# Patient Record
Sex: FEMALE | Race: WHITE | NOT HISPANIC OR LATINO | Employment: FULL TIME | URBAN - METROPOLITAN AREA
[De-identification: names, ages, dates, MRNs, and addresses within clinical notes are randomized per-mention and may not be internally consistent; named-entity substitution may affect disease eponyms.]

---

## 2017-01-06 ENCOUNTER — COMMUNICATION - HEALTHEAST (OUTPATIENT)
Dept: INTERNAL MEDICINE | Facility: CLINIC | Age: 47
End: 2017-01-06

## 2017-06-29 ENCOUNTER — COMMUNICATION - HEALTHEAST (OUTPATIENT)
Dept: INTERNAL MEDICINE | Facility: CLINIC | Age: 47
End: 2017-06-29

## 2017-06-29 DIAGNOSIS — N63.10 MASS OF BREAST, RIGHT: ICD-10-CM

## 2017-07-03 ENCOUNTER — HOSPITAL ENCOUNTER (OUTPATIENT)
Dept: MAMMOGRAPHY | Facility: CLINIC | Age: 47
Discharge: HOME | End: 2017-07-03
Attending: INTERNAL MEDICINE

## 2017-07-03 ENCOUNTER — COMMUNICATION - HEALTHEAST (OUTPATIENT)
Dept: TELEHEALTH | Facility: CLINIC | Age: 47
End: 2017-07-03

## 2017-07-03 ENCOUNTER — HOSPITAL ENCOUNTER (OUTPATIENT)
Dept: ULTRASOUND IMAGING | Facility: CLINIC | Age: 47
Discharge: HOME | End: 2017-07-03
Attending: INTERNAL MEDICINE

## 2017-07-03 DIAGNOSIS — N63.10 MASS OF BREAST, RIGHT: ICD-10-CM

## 2017-07-05 ENCOUNTER — OFFICE VISIT - HEALTHEAST (OUTPATIENT)
Dept: INTERNAL MEDICINE | Facility: CLINIC | Age: 47
End: 2017-07-05

## 2017-07-05 DIAGNOSIS — R10.13 DYSPEPSIA: ICD-10-CM

## 2017-07-05 DIAGNOSIS — Z00.00 ROUTINE GENERAL MEDICAL EXAMINATION AT A HEALTH CARE FACILITY: ICD-10-CM

## 2017-07-05 DIAGNOSIS — Z12.4 SCREENING FOR CERVICAL CANCER: ICD-10-CM

## 2017-07-05 DIAGNOSIS — Z13.1 SCREENING FOR DIABETES MELLITUS: ICD-10-CM

## 2017-07-05 DIAGNOSIS — Z13.220 SCREENING FOR HYPERLIPIDEMIA: ICD-10-CM

## 2017-07-05 LAB
CHOLEST SERPL-MCNC: 154 MG/DL
FASTING STATUS PATIENT QL REPORTED: YES
HDLC SERPL-MCNC: 61 MG/DL
LDLC SERPL CALC-MCNC: 82 MG/DL
TRIGL SERPL-MCNC: 54 MG/DL

## 2017-07-05 ASSESSMENT — MIFFLIN-ST. JEOR: SCORE: 1523.07

## 2017-07-10 LAB
HPV INTERPRETATION - HISTORICAL: NORMAL
HPV INTERPRETER - HISTORICAL: NORMAL

## 2017-07-17 LAB
BKR LAB AP ABNORMAL BLEEDING: NO
BKR LAB AP BIRTH CONTROL/HORMONES: NORMAL
BKR LAB AP CERVICAL APPEARANCE: NORMAL
BKR LAB AP GYN ADEQUACY: NORMAL
BKR LAB AP GYN INTERPRETATION: NORMAL
BKR LAB AP HPV REFLEX: NORMAL
BKR LAB AP LMP: NORMAL
BKR LAB AP PATIENT STATUS: NO
BKR LAB AP PREVIOUS ABNORMAL: NO
BKR LAB AP PREVIOUS NORMAL: NORMAL
HIGH RISK?: NO
PATH REPORT.COMMENTS IMP SPEC: NORMAL
RESULT FLAG (HE HISTORICAL CONVERSION): NORMAL

## 2017-07-18 ENCOUNTER — COMMUNICATION - HEALTHEAST (OUTPATIENT)
Dept: INTERNAL MEDICINE | Facility: CLINIC | Age: 47
End: 2017-07-18

## 2017-08-03 ENCOUNTER — COMMUNICATION - HEALTHEAST (OUTPATIENT)
Dept: INTERNAL MEDICINE | Facility: CLINIC | Age: 47
End: 2017-08-03

## 2017-10-12 ENCOUNTER — RECORDS - HEALTHEAST (OUTPATIENT)
Dept: ADMINISTRATIVE | Facility: OTHER | Age: 47
End: 2017-10-12

## 2017-10-20 ENCOUNTER — ALLIED HEALTH/NURSE VISIT (OUTPATIENT)
Dept: NURSING | Facility: CLINIC | Age: 47
End: 2017-10-20
Payer: COMMERCIAL

## 2017-10-20 DIAGNOSIS — Z23 NEED FOR PROPHYLACTIC VACCINATION AND INOCULATION AGAINST INFLUENZA: Primary | ICD-10-CM

## 2017-10-20 PROCEDURE — 90686 IIV4 VACC NO PRSV 0.5 ML IM: CPT

## 2017-10-20 PROCEDURE — 99207 ZZC NO CHARGE NURSE ONLY: CPT

## 2017-10-20 PROCEDURE — 90471 IMMUNIZATION ADMIN: CPT

## 2017-10-20 NOTE — MR AVS SNAPSHOT
"              After Visit Summary   10/20/2017    Tamera Spence    MRN: [id number]           Patient Information     Date Of Birth          1970        Visit Information        Provider Department      10/20/2017 10:15 AM [facility] IMMUNIZATION NURSE [facility]        Today's Diagnoses     Need for prophylactic vaccination and inoculation against influenza    -  1       Follow-ups after your visit        Who to contact     If you have questions or need follow up information about today's clinic visit or your schedule please contact [facility] directly at [phone].  Normal or non-critical lab and imaging results will be communicated to you by [employer], letter or phone within 4 business days after the clinic has received the results. If you do not hear from us within 7 days, please contact the clinic through [employer] or phone. If you have a critical or abnormal lab result, we will notify you by phone as soon as possible.  Submit refill requests through [employer] or call your pharmacy and they will forward the refill request to us. Please allow 3 business days for your refill to be completed.          Additional Information About Your Visit        MyChart Information     [employer] lets you send messages to your doctor, view your test results, renew your prescriptions, schedule appointments and more. To sign up, go to [email or web address] . Click on \"Log in\" on the left side of the screen, which will take you to the Welcome page. Then click on \"Sign up Now\" on the right side of the page.     You will be asked to enter the access code listed below, as well as some personal information. Please follow the directions to create your username and password.     Your access code is: [email or web address]  Expires: 2018 10:24 AM     Your access code will  in 90 days. If you need help or a new code, please call your [facility] or [phone].        Care " EveryWhere ID     This is your Care EveryWhere ID. This could be used by other organizations to access your [facility] medical records  [id number]         Blood Pressure from Last 3 Encounters:   06/18/15 104/60   12/30/10 104/70   02/08/10 132/68    Weight from Last 3 Encounters:   06/18/15 180 lb (81.6 kg)   02/08/10 170 lb (77.1 kg)   11/18/09 189 lb 9.6 oz (86 kg)              We Performed the Following     ADMIN 1st VACCINE     HC FLU VAC PRESRV FREE QUAD SPLIT VIR 3+YRS IM        Primary Care Provider    None Specified       No primary provider on file.        Equal Access to Services     [facility] : Hadii david Case, nitesh del rio, brianne vergaraalmada riaz, raysa avery . So [facility] [phone].    ATENCIÓN: Si habla español, tiene a recinos disposición servicios gratuitos de asistencia lingüística. Llame al [phone].    We comply with applicable federal civil rights laws and [place] laws. We do not discriminate on the basis of race, color, national origin, age, disability, sex, sexual orientation, or gender identity.            Thank you!     Thank you for choosing [facility]  for your care. Our goal is always to provide you with excellent care. Hearing back from our patients is one way we can continue to improve our services. Please take a few minutes to complete the written survey that you may receive in the mail after your visit with us. Thank you!             Your Updated Medication List - Protect others around you: Learn how to safely use, store and throw away your medicines at www.disposemymeds.org.          This list is accurate as of: 10/20/17 10:24 AM.  Always use your most recent med list.                   Brand Name Dispense Instructions for use Diagnosis    PRENATAL PLUS Tabs     90    1 TABLET DAILY    Supervision of normal first pregnancy       PROTONIX PO

## 2017-10-20 NOTE — PROGRESS NOTES

## 2017-10-23 ENCOUNTER — RECORDS - HEALTHEAST (OUTPATIENT)
Dept: ADMINISTRATIVE | Facility: OTHER | Age: 47
End: 2017-10-23

## 2017-10-24 ENCOUNTER — RECORDS - HEALTHEAST (OUTPATIENT)
Dept: ADMINISTRATIVE | Facility: OTHER | Age: 47
End: 2017-10-24

## 2017-10-25 ENCOUNTER — RECORDS - HEALTHEAST (OUTPATIENT)
Dept: ADMINISTRATIVE | Facility: OTHER | Age: 47
End: 2017-10-25

## 2017-12-01 ENCOUNTER — OFFICE VISIT - HEALTHEAST (OUTPATIENT)
Dept: INTERNAL MEDICINE | Facility: CLINIC | Age: 47
End: 2017-12-01

## 2017-12-01 DIAGNOSIS — R10.13 DYSPEPSIA: ICD-10-CM

## 2017-12-01 DIAGNOSIS — F51.01 PRIMARY INSOMNIA: ICD-10-CM

## 2017-12-01 ASSESSMENT — MIFFLIN-ST. JEOR: SCORE: 1523.07

## 2018-01-26 ENCOUNTER — COMMUNICATION - HEALTHEAST (OUTPATIENT)
Dept: INTERNAL MEDICINE | Facility: CLINIC | Age: 48
End: 2018-01-26

## 2018-01-26 DIAGNOSIS — H26.9 CATARACTS, BILATERAL: ICD-10-CM

## 2018-02-01 ENCOUNTER — RECORDS - HEALTHEAST (OUTPATIENT)
Dept: ADMINISTRATIVE | Facility: OTHER | Age: 48
End: 2018-02-01

## 2018-02-02 ENCOUNTER — COMMUNICATION - HEALTHEAST (OUTPATIENT)
Dept: INTERNAL MEDICINE | Facility: CLINIC | Age: 48
End: 2018-02-02

## 2018-07-02 ENCOUNTER — OFFICE VISIT - HEALTHEAST (OUTPATIENT)
Dept: INTERNAL MEDICINE | Facility: CLINIC | Age: 48
End: 2018-07-02

## 2018-07-02 DIAGNOSIS — H00.019 STYE: ICD-10-CM

## 2018-07-09 ENCOUNTER — COMMUNICATION - HEALTHEAST (OUTPATIENT)
Dept: INTERNAL MEDICINE | Facility: CLINIC | Age: 48
End: 2018-07-09

## 2018-07-10 ENCOUNTER — RECORDS - HEALTHEAST (OUTPATIENT)
Dept: ADMINISTRATIVE | Facility: OTHER | Age: 48
End: 2018-07-10

## 2018-08-02 ENCOUNTER — COMMUNICATION - HEALTHEAST (OUTPATIENT)
Dept: INTERNAL MEDICINE | Facility: CLINIC | Age: 48
End: 2018-08-02

## 2018-08-15 ENCOUNTER — COMMUNICATION - HEALTHEAST (OUTPATIENT)
Dept: INTERNAL MEDICINE | Facility: CLINIC | Age: 48
End: 2018-08-15

## 2018-09-07 ENCOUNTER — OFFICE VISIT - HEALTHEAST (OUTPATIENT)
Dept: INTERNAL MEDICINE | Facility: CLINIC | Age: 48
End: 2018-09-07

## 2018-09-07 DIAGNOSIS — Z12.31 VISIT FOR SCREENING MAMMOGRAM: ICD-10-CM

## 2018-09-07 DIAGNOSIS — G47.00 INSOMNIA: ICD-10-CM

## 2018-09-07 DIAGNOSIS — F32.0 MILD MAJOR DEPRESSION (H): ICD-10-CM

## 2018-09-07 ASSESSMENT — MIFFLIN-ST. JEOR: SCORE: 1477.71

## 2018-09-11 ENCOUNTER — RECORDS - HEALTHEAST (OUTPATIENT)
Dept: ADMINISTRATIVE | Facility: OTHER | Age: 48
End: 2018-09-11

## 2018-09-14 ENCOUNTER — COMMUNICATION - HEALTHEAST (OUTPATIENT)
Dept: INTERNAL MEDICINE | Facility: CLINIC | Age: 48
End: 2018-09-14

## 2018-09-19 ENCOUNTER — HOSPITAL ENCOUNTER (OUTPATIENT)
Dept: MAMMOGRAPHY | Facility: CLINIC | Age: 48
Discharge: HOME | End: 2018-09-19
Attending: INTERNAL MEDICINE

## 2018-09-19 DIAGNOSIS — Z12.31 VISIT FOR SCREENING MAMMOGRAM: ICD-10-CM

## 2018-12-14 ENCOUNTER — COMMUNICATION - HEALTHEAST (OUTPATIENT)
Dept: INTERNAL MEDICINE | Facility: CLINIC | Age: 48
End: 2018-12-14

## 2019-01-31 ENCOUNTER — COMMUNICATION - HEALTHEAST (OUTPATIENT)
Dept: INTERNAL MEDICINE | Facility: CLINIC | Age: 49
End: 2019-01-31

## 2019-02-07 ENCOUNTER — OFFICE VISIT - HEALTHEAST (OUTPATIENT)
Dept: INTERNAL MEDICINE | Facility: CLINIC | Age: 49
End: 2019-02-07

## 2019-02-07 DIAGNOSIS — Z11.4 SCREENING FOR HIV (HUMAN IMMUNODEFICIENCY VIRUS): ICD-10-CM

## 2019-02-07 DIAGNOSIS — F51.01 PRIMARY INSOMNIA: ICD-10-CM

## 2019-02-07 DIAGNOSIS — Z11.3 SCREEN FOR STD (SEXUALLY TRANSMITTED DISEASE): ICD-10-CM

## 2019-02-07 DIAGNOSIS — F32.0 MILD MAJOR DEPRESSION (H): ICD-10-CM

## 2019-02-07 DIAGNOSIS — K21.9 GASTROESOPHAGEAL REFLUX DISEASE WITHOUT ESOPHAGITIS: ICD-10-CM

## 2019-02-07 LAB — HIV 1+2 AB+HIV1 P24 AG SERPL QL IA: NEGATIVE

## 2019-02-07 ASSESSMENT — MIFFLIN-ST. JEOR: SCORE: 1482.24

## 2019-02-08 LAB
C TRACH DNA SPEC QL PROBE+SIG AMP: NEGATIVE
N GONORRHOEA DNA SPEC QL NAA+PROBE: NEGATIVE
T PALLIDUM AB SER QL: NEGATIVE

## 2019-09-12 ENCOUNTER — HOSPITAL ENCOUNTER (OUTPATIENT)
Dept: RADIOLOGY | Facility: CLINIC | Age: 49
Discharge: HOME | End: 2019-09-12
Attending: INTERNAL MEDICINE

## 2019-09-12 ENCOUNTER — OFFICE VISIT - HEALTHEAST (OUTPATIENT)
Dept: INTERNAL MEDICINE | Facility: CLINIC | Age: 49
End: 2019-09-12

## 2019-09-12 DIAGNOSIS — G89.29 CHRONIC RIGHT SHOULDER PAIN: ICD-10-CM

## 2019-09-12 DIAGNOSIS — M25.511 CHRONIC RIGHT SHOULDER PAIN: ICD-10-CM

## 2019-09-12 ASSESSMENT — PATIENT HEALTH QUESTIONNAIRE - PHQ9: SUM OF ALL RESPONSES TO PHQ QUESTIONS 1-9: 0

## 2019-09-12 ASSESSMENT — MIFFLIN-ST. JEOR: SCORE: 1591.11

## 2019-12-13 ENCOUNTER — AMBULATORY - HEALTHEAST (OUTPATIENT)
Dept: INTERNAL MEDICINE | Facility: CLINIC | Age: 49
End: 2019-12-13

## 2019-12-13 ENCOUNTER — RECORDS - HEALTHEAST (OUTPATIENT)
Dept: ADMINISTRATIVE | Facility: OTHER | Age: 49
End: 2019-12-13

## 2019-12-13 ENCOUNTER — COMMUNICATION - HEALTHEAST (OUTPATIENT)
Dept: SCHEDULING | Facility: CLINIC | Age: 49
End: 2019-12-13

## 2019-12-13 DIAGNOSIS — H53.8 FLASHING LIGHTS SEEN: ICD-10-CM

## 2019-12-16 ENCOUNTER — COMMUNICATION - HEALTHEAST (OUTPATIENT)
Dept: INTERNAL MEDICINE | Facility: CLINIC | Age: 49
End: 2019-12-16

## 2020-01-09 ENCOUNTER — COMMUNICATION - HEALTHEAST (OUTPATIENT)
Dept: INTERNAL MEDICINE | Facility: CLINIC | Age: 50
End: 2020-01-09

## 2020-01-10 ENCOUNTER — COMMUNICATION - HEALTHEAST (OUTPATIENT)
Dept: INTERNAL MEDICINE | Facility: CLINIC | Age: 50
End: 2020-01-10

## 2020-01-13 ENCOUNTER — AMBULATORY - HEALTHEAST (OUTPATIENT)
Dept: INTERNAL MEDICINE | Facility: CLINIC | Age: 50
End: 2020-01-13

## 2020-01-13 ENCOUNTER — AMBULATORY - HEALTHEAST (OUTPATIENT)
Dept: LAB | Facility: CLINIC | Age: 50
End: 2020-01-13

## 2020-01-13 DIAGNOSIS — Z11.3 SCREEN FOR STD (SEXUALLY TRANSMITTED DISEASE): ICD-10-CM

## 2020-01-14 ENCOUNTER — AMBULATORY - HEALTHEAST (OUTPATIENT)
Dept: LAB | Facility: CLINIC | Age: 50
End: 2020-01-14

## 2020-01-14 DIAGNOSIS — Z11.3 SCREEN FOR STD (SEXUALLY TRANSMITTED DISEASE): ICD-10-CM

## 2020-01-14 LAB — HIV 1+2 AB+HIV1 P24 AG SERPL QL IA: NEGATIVE

## 2020-01-15 LAB
C TRACH DNA SPEC QL PROBE+SIG AMP: NEGATIVE
HSV1 IGG SERPL QL IA: NEGATIVE
HSV2 IGG SERPL QL IA: NEGATIVE
N GONORRHOEA DNA SPEC QL NAA+PROBE: NEGATIVE
T PALLIDUM AB SER QL: NEGATIVE

## 2020-01-17 LAB
ARUP MISCELLANEOUS TEST: ABNORMAL
MISCELLANEOUS TEST DEPT. - HE HISTORICAL: NORMAL
PERFORMING LAB: NORMAL
SPECIMEN STATUS: NORMAL
TEST NAME: NORMAL

## 2020-01-29 ENCOUNTER — OFFICE VISIT - HEALTHEAST (OUTPATIENT)
Dept: INTERNAL MEDICINE | Facility: CLINIC | Age: 50
End: 2020-01-29

## 2020-01-29 DIAGNOSIS — M25.511 CHRONIC RIGHT SHOULDER PAIN: ICD-10-CM

## 2020-01-29 DIAGNOSIS — Z13.220 SCREENING FOR HYPERLIPIDEMIA: ICD-10-CM

## 2020-01-29 DIAGNOSIS — Z00.00 ROUTINE GENERAL MEDICAL EXAMINATION AT A HEALTH CARE FACILITY: ICD-10-CM

## 2020-01-29 DIAGNOSIS — G89.29 CHRONIC RIGHT SHOULDER PAIN: ICD-10-CM

## 2020-01-29 DIAGNOSIS — Z12.31 VISIT FOR SCREENING MAMMOGRAM: ICD-10-CM

## 2020-01-29 DIAGNOSIS — Z13.1 SCREENING FOR DIABETES MELLITUS: ICD-10-CM

## 2020-01-29 DIAGNOSIS — Z12.11 SCREEN FOR COLON CANCER: ICD-10-CM

## 2020-01-29 ASSESSMENT — MIFFLIN-ST. JEOR: SCORE: 1586.57

## 2020-02-26 ENCOUNTER — COMMUNICATION - HEALTHEAST (OUTPATIENT)
Dept: INTERNAL MEDICINE | Facility: CLINIC | Age: 50
End: 2020-02-26

## 2020-02-28 ENCOUNTER — OFFICE VISIT - HEALTHEAST (OUTPATIENT)
Dept: PHYSICAL THERAPY | Facility: REHABILITATION | Age: 50
End: 2020-02-28

## 2020-02-28 ENCOUNTER — COMMUNICATION - HEALTHEAST (OUTPATIENT)
Dept: INTERNAL MEDICINE | Facility: CLINIC | Age: 50
End: 2020-02-28

## 2020-02-28 DIAGNOSIS — M25.511 CHRONIC RIGHT SHOULDER PAIN: ICD-10-CM

## 2020-02-28 DIAGNOSIS — G89.29 CHRONIC RIGHT SHOULDER PAIN: ICD-10-CM

## 2020-03-10 ENCOUNTER — OFFICE VISIT - HEALTHEAST (OUTPATIENT)
Dept: PHYSICAL THERAPY | Facility: REHABILITATION | Age: 50
End: 2020-03-10

## 2020-03-10 DIAGNOSIS — G89.29 CHRONIC RIGHT SHOULDER PAIN: ICD-10-CM

## 2020-03-10 DIAGNOSIS — M25.511 CHRONIC RIGHT SHOULDER PAIN: ICD-10-CM

## 2020-03-11 ENCOUNTER — RECORDS - HEALTHEAST (OUTPATIENT)
Dept: ADMINISTRATIVE | Facility: OTHER | Age: 50
End: 2020-03-11

## 2020-03-11 LAB — COLOGUARD-ABSTRACT: NEGATIVE

## 2020-03-13 ENCOUNTER — OFFICE VISIT - HEALTHEAST (OUTPATIENT)
Dept: PHYSICAL THERAPY | Facility: REHABILITATION | Age: 50
End: 2020-03-13

## 2020-03-13 DIAGNOSIS — M25.511 CHRONIC RIGHT SHOULDER PAIN: ICD-10-CM

## 2020-03-13 DIAGNOSIS — G89.29 CHRONIC RIGHT SHOULDER PAIN: ICD-10-CM

## 2020-03-19 ENCOUNTER — RECORDS - HEALTHEAST (OUTPATIENT)
Dept: HEALTH INFORMATION MANAGEMENT | Facility: CLINIC | Age: 50
End: 2020-03-19

## 2020-04-13 ENCOUNTER — COMMUNICATION - HEALTHEAST (OUTPATIENT)
Dept: PHYSICAL THERAPY | Facility: REHABILITATION | Age: 50
End: 2020-04-13

## 2020-04-15 ENCOUNTER — OFFICE VISIT - HEALTHEAST (OUTPATIENT)
Dept: PHYSICAL THERAPY | Facility: REHABILITATION | Age: 50
End: 2020-04-15

## 2020-04-15 DIAGNOSIS — M25.511 CHRONIC RIGHT SHOULDER PAIN: ICD-10-CM

## 2020-04-15 DIAGNOSIS — G89.29 CHRONIC RIGHT SHOULDER PAIN: ICD-10-CM

## 2020-04-30 ENCOUNTER — COMMUNICATION - HEALTHEAST (OUTPATIENT)
Dept: PHYSICAL THERAPY | Facility: REHABILITATION | Age: 50
End: 2020-04-30

## 2020-06-02 ENCOUNTER — COMMUNICATION - HEALTHEAST (OUTPATIENT)
Dept: SCHEDULING | Facility: CLINIC | Age: 50
End: 2020-06-02

## 2020-06-03 ENCOUNTER — COMMUNICATION - HEALTHEAST (OUTPATIENT)
Dept: INTERNAL MEDICINE | Facility: CLINIC | Age: 50
End: 2020-06-03

## 2020-06-03 ENCOUNTER — OFFICE VISIT - HEALTHEAST (OUTPATIENT)
Dept: INTERNAL MEDICINE | Facility: CLINIC | Age: 50
End: 2020-06-03

## 2020-06-03 DIAGNOSIS — Z30.430 ENCOUNTER FOR IUD INSERTION: ICD-10-CM

## 2020-06-03 DIAGNOSIS — N89.8 VAGINAL DISCHARGE: ICD-10-CM

## 2020-06-03 LAB
CLUE CELLS: ABNORMAL
HIV 1+2 AB+HIV1 P24 AG SERPL QL IA: NEGATIVE
TRICHOMONAS, WET PREP: ABNORMAL
YEAST, WET PREP: ABNORMAL

## 2020-06-03 ASSESSMENT — MIFFLIN-ST. JEOR: SCORE: 1554.82

## 2020-06-04 ENCOUNTER — COMMUNICATION - HEALTHEAST (OUTPATIENT)
Dept: INTERNAL MEDICINE | Facility: CLINIC | Age: 50
End: 2020-06-04

## 2020-06-04 DIAGNOSIS — N89.8 VAGINAL DISCHARGE: ICD-10-CM

## 2020-06-04 LAB
HCV AB SERPL QL IA: NEGATIVE
HPV SOURCE: NORMAL
HUMAN PAPILLOMA VIRUS 16 DNA: NEGATIVE
HUMAN PAPILLOMA VIRUS 18 DNA: NEGATIVE
HUMAN PAPILLOMA VIRUS FINAL DIAGNOSIS: NORMAL
HUMAN PAPILLOMA VIRUS OTHER HR: NEGATIVE
SPECIMEN DESCRIPTION: NORMAL

## 2020-06-05 ENCOUNTER — COMMUNICATION - HEALTHEAST (OUTPATIENT)
Dept: INTERNAL MEDICINE | Facility: CLINIC | Age: 50
End: 2020-06-05

## 2020-06-05 DIAGNOSIS — N89.8 VAGINAL DISCHARGE: ICD-10-CM

## 2020-06-05 LAB
C TRACH DNA SPEC QL PROBE+SIG AMP: NEGATIVE
N GONORRHOEA DNA SPEC QL NAA+PROBE: NEGATIVE

## 2020-06-09 ENCOUNTER — HOSPITAL ENCOUNTER (OUTPATIENT)
Dept: ULTRASOUND IMAGING | Facility: CLINIC | Age: 50
Discharge: HOME | End: 2020-06-09
Attending: INTERNAL MEDICINE

## 2020-06-09 DIAGNOSIS — Z30.430 ENCOUNTER FOR IUD INSERTION: ICD-10-CM

## 2020-06-09 LAB

## 2020-09-17 ENCOUNTER — COMMUNICATION - HEALTHEAST (OUTPATIENT)
Dept: INTERNAL MEDICINE | Facility: CLINIC | Age: 50
End: 2020-09-17

## 2020-09-17 DIAGNOSIS — G89.29 CHRONIC RIGHT SHOULDER PAIN: ICD-10-CM

## 2020-09-17 DIAGNOSIS — M25.511 CHRONIC RIGHT SHOULDER PAIN: ICD-10-CM

## 2020-10-20 ENCOUNTER — OFFICE VISIT - HEALTHEAST (OUTPATIENT)
Dept: PHYSICAL THERAPY | Facility: REHABILITATION | Age: 50
End: 2020-10-20

## 2020-10-20 DIAGNOSIS — M75.41 IMPINGEMENT SYNDROME OF RIGHT SHOULDER: ICD-10-CM

## 2020-10-20 DIAGNOSIS — M25.511 CHRONIC RIGHT SHOULDER PAIN: ICD-10-CM

## 2020-10-20 DIAGNOSIS — G89.29 CHRONIC RIGHT SHOULDER PAIN: ICD-10-CM

## 2020-11-20 ENCOUNTER — COMMUNICATION - HEALTHEAST (OUTPATIENT)
Dept: INTERNAL MEDICINE | Facility: CLINIC | Age: 50
End: 2020-11-20

## 2021-04-05 ENCOUNTER — COMMUNICATION - HEALTHEAST (OUTPATIENT)
Dept: INTERNAL MEDICINE | Facility: CLINIC | Age: 51
End: 2021-04-05

## 2021-05-05 ENCOUNTER — OFFICE VISIT - HEALTHEAST (OUTPATIENT)
Dept: INTERNAL MEDICINE | Facility: CLINIC | Age: 51
End: 2021-05-05

## 2021-05-05 DIAGNOSIS — T50.Z95A ADVERSE EFFECT OF VACCINE, INITIAL ENCOUNTER: ICD-10-CM

## 2021-05-05 DIAGNOSIS — N63.0 LUMP OR MASS IN BREAST: ICD-10-CM

## 2021-05-26 ASSESSMENT — PATIENT HEALTH QUESTIONNAIRE - PHQ9: SUM OF ALL RESPONSES TO PHQ QUESTIONS 1-9: 0

## 2021-05-27 VITALS
SYSTOLIC BLOOD PRESSURE: 132 MMHG | DIASTOLIC BLOOD PRESSURE: 76 MMHG | OXYGEN SATURATION: 99 % | BODY MASS INDEX: 26.78 KG/M2 | WEIGHT: 192 LBS | HEART RATE: 78 BPM

## 2021-05-30 ENCOUNTER — HEALTH MAINTENANCE LETTER (OUTPATIENT)
Age: 51
End: 2021-05-30

## 2021-05-31 VITALS — BODY MASS INDEX: 25.06 KG/M2 | WEIGHT: 179 LBS | HEIGHT: 71 IN

## 2021-06-01 VITALS — BODY MASS INDEX: 24.67 KG/M2 | WEIGHT: 176.9 LBS

## 2021-06-01 NOTE — PROGRESS NOTES
"  Office Visit - Follow Up   Tamera Spence   48 y.o. female    Date of Visit: 9/12/2019    Chief Complaint   Patient presents with     Shoulder Pain     right        Assessment and Plan   1. Chronic right shoulder pain  I suspect she has some shoulder tendinopathy.  I recommended Codman maneuvers, rest, ice, ibuprofen and physical therapy.  If not improving I suggested referral to orthopedic specialist  - XR Shoulder Right 2 or More VWS; Future  - Ambulatory referral to Adult PT- Internal    Return in about 4 weeks (around 10/10/2019).     History of Present Illness   This 48 y.o. old woman comes in for evaluation of right shoulder pain.  Is been going on for 2 months.  Worse with overhead range of motion.  No specific injury.  She has not tried anything to make it better.    Review of Systems: A comprehensive review of systems was negative except as noted.     Medications, Allergies and Problem List   Reviewed, reconciled and updated  Post Discharge Medication Reconciliation Status:      Physical Exam   General Appearance:   No acute distress    /80 (Patient Site: Left Arm, Patient Position: Sitting, Cuff Size: Adult Regular)   Pulse 74   Ht 5' 11\" (1.803 m)   Wt 194 lb (88 kg)   SpO2 99%   BMI 27.06 kg/m      She has some tenderness over the biceps tendon.  This is worse with overhead range of motion.  Good internal range of motion.  Good strength.  No deformity.     Additional Information   Current Outpatient Medications   Medication Sig Dispense Refill     citalopram (CELEXA) 20 MG tablet Take 1 tablet (20 mg total) by mouth daily. 90 tablet 3     traZODone (DESYREL) 50 MG tablet 1-2 tabs at hs. 60 tablet 11     No current facility-administered medications for this visit.      No Known Allergies  Social History     Tobacco Use     Smoking status: Never Smoker     Smokeless tobacco: Never Used   Substance Use Topics     Alcohol use: Yes     Comment: 1/month     Drug use: No       Review and/or order " of clinical lab tests:  Review and/or order of radiology tests:  Review and/or order of medicine tests:  Discussion of test results with performing physician:  Decision to obtain old records and/or obtain history from someone other than the patient:  Review and summarization of old records and/or obtaining history from someone other than the patient and.or discussion of case with another health care provider:  Independent visualization of image, tracing or specimen itself:    Time:      Allan To MD

## 2021-06-02 ENCOUNTER — RECORDS - HEALTHEAST (OUTPATIENT)
Dept: ADMINISTRATIVE | Facility: CLINIC | Age: 51
End: 2021-06-02

## 2021-06-02 VITALS — BODY MASS INDEX: 23.66 KG/M2 | HEIGHT: 71 IN | WEIGHT: 169 LBS

## 2021-06-02 VITALS — BODY MASS INDEX: 23.8 KG/M2 | HEIGHT: 71 IN | WEIGHT: 170 LBS

## 2021-06-03 ENCOUNTER — RECORDS - HEALTHEAST (OUTPATIENT)
Dept: ADMINISTRATIVE | Facility: CLINIC | Age: 51
End: 2021-06-03

## 2021-06-03 VITALS
WEIGHT: 194 LBS | HEART RATE: 74 BPM | SYSTOLIC BLOOD PRESSURE: 116 MMHG | BODY MASS INDEX: 27.16 KG/M2 | OXYGEN SATURATION: 99 % | HEIGHT: 71 IN | DIASTOLIC BLOOD PRESSURE: 80 MMHG

## 2021-06-04 VITALS
WEIGHT: 186 LBS | SYSTOLIC BLOOD PRESSURE: 118 MMHG | HEART RATE: 84 BPM | BODY MASS INDEX: 26.04 KG/M2 | DIASTOLIC BLOOD PRESSURE: 66 MMHG | OXYGEN SATURATION: 98 % | HEIGHT: 71 IN

## 2021-06-04 VITALS
DIASTOLIC BLOOD PRESSURE: 82 MMHG | OXYGEN SATURATION: 98 % | WEIGHT: 193 LBS | SYSTOLIC BLOOD PRESSURE: 134 MMHG | BODY MASS INDEX: 27.02 KG/M2 | HEART RATE: 90 BPM | HEIGHT: 71 IN

## 2021-06-04 NOTE — TELEPHONE ENCOUNTER
"Pt states \"Saw a bunch of bright lights when looking at my phone.\"  Brief episode.  Occurred 18 hours ago.  Recurred later while in the shower.    Pt cannot determine whether one or both eyes were involved.  No pain whatsoever.  \"R eye feels a little tired today.\"  No other sensations.  No other symptoms today.    Episode of 'bright lights' has NOT recurred today.    Advised pt to seek eval at her ophthalmologist today ideally, or at least within 24 hours.  Pt states \"cannot remember where I went last time.\"  Advised pt to call her customer service # on back of health insurance card for best info on in-network ophthalmologists.    Pt also asks that a Referral to Ophthalmology be entered into chart.    Thank you-    Nathalia Llanos RN  Care Connection Triage     Reason for Disposition    Flashes of light  (Exception: brief from pressing on the eyeball)     NOT occurring today.    Protocols used: VISION LOSS OR CHANGE-A-OH      "

## 2021-06-04 NOTE — TELEPHONE ENCOUNTER
[facility] is not in our network and an insurance referral will not be given for out of network facilities.    LM for pt to call us back to schedule for an ASAP with a facility in-network.

## 2021-06-04 NOTE — TELEPHONE ENCOUNTER
Who is calling:  Patient   Reason for Call:  She is calling back as she received the message stating a referral will not be placed for an out of network facility. She is currently in the ophthalmologist office at [facility] and asking for assistance with this. She was placed on hold while writer contact the clinic specialty , but the patient disconnected the call.   Date of last appointment with primary care: 9/12/2019  Okay to leave a detailed message: Yes

## 2021-06-04 NOTE — TELEPHONE ENCOUNTER
FLORIN for pt with our direct number [phone] to help her get scheduled at an in-network facility.

## 2021-06-04 NOTE — TELEPHONE ENCOUNTER
Order has been placed for Dr. To to review per message below.     Spoke with the patient who stated that she is currently at [facility] in [facility] seeing an eye doctor, relayed message below from Dr. To.  Patient did not supply a phone or fax number.  She had no further questions at this time.    Deysi JAUREGUI CMA/JOSE ALFREDO....................9:01 AM

## 2021-06-05 NOTE — TELEPHONE ENCOUNTER
Hugo Camarillo for lab orders requested below?  Please advise.  Deysi JAUREGUI, BARBRA/CMT....................3:23 PM

## 2021-06-05 NOTE — PROGRESS NOTES
Office Visit - Physical   Tamera Spence   49 y.o.  female    Date of visit: 1/29/2020  Physician: Allan To MD     Assessment and Plan   1. Routine general medical examination at a health care facility  This generally healthy 49-year-old woman with issues as discussed below.  Ongoing healthy lifestyle discussed and recommended.    2. Visit for screening mammogram  - Mammo Screening Bilateral; Future    3. Screen for colon cancer  - Cologuard    4. Screening for diabetes mellitus  - Glycosylated Hemoglobin A1c    5. Screening for hyperlipidemia  - Lipid Cascade    6. Chronic right shoulder pain  - Ambulatory referral to Adult PT- Internal    The following high BMI interventions were performed this visit: encouragement to exercise and lifestyle education regarding diet    Return in about 1 year (around 1/29/2021) for annual physical.     Chief Complaint   Chief Complaint   Patient presents with     Annual Exam        Patient Profile   Social History     Social History Narrative    . She has son (tranistioned from girl to boy), Anthony (2009), and daughter Mariya (2006). 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  She is originally from [place], [place], and attended college at the [employer] and [employer].         Past Medical History   Patient Active Problem List   Diagnosis     Acid reflux       Past Surgical History  She has a past surgical history that includes Thurman tooth extraction.     History of Present Illness   This 49 y.o. old woman comes in for annual physical.  No complaints or concerns with the exception of chronic right shoulder pain which she would like to physical therapy.    Review of Systems: A comprehensive review of systems was negative except as noted.     Medications and Allergies   No current outpatient medications on file.     No current facility-administered medications for this visit.   "    No Known Allergies     Family and Social History   Family History   Problem Relation Age of Onset     Hypertension Mother      No Medical Problems Father      No Medical Problems Sister      No Medical Problems Son      No Medical Problems Daughter      No Medical Problems Sister         Social History     Tobacco Use     Smoking status: Never Smoker     Smokeless tobacco: Never Used   Substance Use Topics     Alcohol use: Yes     Comment: 1/month     Drug use: No        Physical Exam   General Appearance:   No acute distress    /82 (Patient Site: Right Arm, Patient Position: Sitting, Cuff Size: Adult Regular)   Pulse 90   Ht 5' 11\" (1.803 m)   Wt 193 lb (87.5 kg)   SpO2 98%   BMI 26.92 kg/m      EYES: Eyelids, conjunctiva, and sclera were normal. Pupils were normal. Cornea, iris, and lens were normal bilaterally.  HEAD, EARS, NOSE, MOUTH, AND THROAT: Head and face were normal. Hearing was normal to voice and the ears were normal to external exam. Nose appearance was normal and there was no discharge. Oropharynx was normal.  NECK: Neck appearance was normal. There were no neck masses and the thyroid was not enlarged.  RESPIRATORY: Breathing pattern was normal and the chest moved symmetrically.  Percussion/auscultatory percussion was normal.  Lung sounds were normal and there were no abnormal sounds.  CARDIOVASCULAR: Heart rate and rhythm were normal.  S1 and S2 were normal and there were no extra sounds or murmurs. Peripheral pulses in arms and legs were normal.  Jugular venous pressure was normal.  There was no peripheral edema.  GASTROINTESTINAL: The abdomen was normal in contour.  Bowel sounds were present.  Percussion detected no organ enlargement or tenderness.  Palpation detected no tenderness, mass, or enlarged organs.   MUSCULOSKELETAL: Skeletal configuration was normal and muscle mass was normal for age. Joint appearance was overall normal.  LYMPHATIC: There were no enlarged " nodes.  SKIN/HAIR/NAILS: Skin color was normal.  There were no skin lesions.  Hair and nails were normal.  NEUROLOGIC: The patient was alert and oriented to person, place, time, and circumstance. Speech was normal. Cranial nerves were normal. Motor strength was normal for age. The patient was normally coordinated.  PSYCHIATRIC:  Mood and affect were normal and the patient had normal recent and remote memory. The patient's judgment and insight were normal.  CHEST WALL/BREASTS: Normal breast exam       Additional Information        Allan To MD  Internal Medicine  Contact me at [phone]

## 2021-06-05 NOTE — TELEPHONE ENCOUNTER
Who is calling:  Patient  Reason for Call:    Patient is requesting the same labs that were done on 2/7/2019 to be done again.  She would like this done right away, plus a test for Herpes as she has a new sexual partner.  Patient has appointment with Provider on 1/29/2020.  Date of last appointment with primary care: 9/12/19  Okay to leave a detailed message: Yes

## 2021-06-05 NOTE — TELEPHONE ENCOUNTER
Dr. To,  Lab orders have been placed for you to review.  Deysi JAUREGUI, BARBRA/CMT....................10:56 AM

## 2021-06-06 NOTE — PROGRESS NOTES
"[facility] Daily Progress     Patient Name: Tamera Spence  Date: 3/13/2020  Visit #3  Referral Diagnosis: Chronic right shoulder pain  Referring provider: Allan To MD  Visit Diagnosis:     ICD-10-CM    1. Chronic right shoulder pain  M25.511     G89.29          Assessment:     HEP/POC compliance is  good .  Patient demonstrates understanding/independence with home program.  Response to Intervention good  Patient is benefitting from skilled physical therapy and is making steady progress toward functional goals.  Patient is appropriate to continue with skilled physical therapy intervention, as indicated by initial plan of care.    Goal Status:  Pt. will demonstrate/verbalize independence in self-management of condition in : 6 weeks  Pt. will be independent with home exercise program in : 6 weeks  Patient will return to: exercise;sport;in 6 weeks;Comment  Comment: able to swim using (R) arm without pain  Patient will reach / maintain arm movement: overhead;for dressing;with full ROM;with no pain;in 6 weeks;Comment;for home chores  Comment: and to the side  Patient will perform repetitive movement in : arm;with no pain;in 6 weeks;Comment  Comment: for exercise and ADLs    No data recorded    Plan / Patient Education:     Continue with initial plan of care.  Progress with home program as tolerated.    Subjective:     Pain Ratin  Doing better with the exercises. Shoulder is feeling about the same as last time; elbow is a little better. Hasn't been swimming. Reaching above shoulder height is \"definitely better\".    Objective:     Mild/mod tenderness of (R) ant shoulder, joint line, lesser tuberosity.    Exercises:  Exercise #4: scaption  Comment #4: 10-15 x 1#      Treatment Today     TREATMENT MINUTES COMMENTS   Evaluation     Self-care/ Home management     Manual therapy 23 Induction, indirect, direct techniques utilized as appropriate for optimal tissue release.   MFR/SCS - (R) " SLAPA(C)-MS, (R) medial elbow/wrist flexors, (R) HET(P),    Neuromuscular Re-education     Therapeutic Activity     Therapeutic Exercises 5 Exercises per flow sheet.    Gait training     Modality__________________                Total 28     Blank areas are intentional and mean the treatment did not include these items.       Tashia Cerrato  3/13/2020

## 2021-06-06 NOTE — PROGRESS NOTES
[facility]   Shoulder Initial Evaluation    Patient Name: Tamera Spence  Date of evaluation: 2/28/2020  Visit #1/12  Referral Diagnosis: Chronic right shoulder pain  Referring provider: Allan To MD  Visit Diagnosis:     ICD-10-CM    1. Chronic right shoulder pain M25.511     G89.29        Assessment:   Tamera Spence is a 49 y.o. female who presents to therapy today with chief complaints of (R) shoulder and elbow pain. Onset date of sx was 5/2019.  Functional impairments include reaching, lifting, swimming, lying on (R) side, dressing, personal cares.  Clinical findings include decreased (R) shoulder ROM with end range pain, pain with strength testing, tenderness of ant/post shoulder complex, pain with impingement provocation tests.        Pt. is appropriate for skilled PT intervention as outlined in the Plan of Care (POC).  Pt. is a good candidate for skilled PT services to improve pain levels and function.    Goals:  Pt. will demonstrate/verbalize independence in self-management of condition in : 6 weeks  Pt. will be independent with home exercise program in : 6 weeks  Patient will return to: exercise;sport;in 6 weeks;Comment  Comment: able to swim using (R) arm without pain  Patient will reach / maintain arm movement: overhead;for dressing;with full ROM;with no pain;in 6 weeks;Comment;for home chores  Comment: and to the side  Patient will perform repetitive movement in : arm;with no pain;in 6 weeks;Comment  Comment: for exercise and ADLs    No data recorded    Patient's expectations/goals are realistic.    Barriers to Learning or Achieving Goals:  No Barriers.       Plan / Patient Instructions:        Plan of Care:   Authorization / Certification Number of Visits: [place]  Communication with: Referral Source  Patient Related Instruction: Nature of Condition;Treatment plan and rationale;Self Care instruction;Basis of treatment;Posture;Next steps;Body mechanics  Times  per Week: 1-2  Number of Weeks: 6-12  Number of Visits:  up to 12 PRN  Discharge Planning: HEP, self management  Precautions / Restrictions : none  Therapeutic Exercise: ROM;Stretching;Strengthening  Neuromuscular Reeducation: posture;kinesio tape  Manual Therapy: myofascial release;strain counterstrain      POC and pathology of condition were reviewed with patient.  Pt. is in agreement with the Plan of Care  A Home Exercise Program (HEP) was initiated today.     Plan for next visit: manual therapy, elbow stretches, progression of home program  .   Subjective:       Social information:   Living Situation:lives with others    Occupation:researcher   Work Status:Working full time   Equipment Available: None    History of Present Illness:    Tamera is a 49 y.o. female who presents to therapy today with complaints of (R) superior shoulder pain. Onset of elbow pain with working from home on a lap top last week. Date of onset/duration of symptoms is 5/2019. Onset was gradual and insidious. Symptoms are constant and getting worse. She denies history of similar symptoms. She describes their previous level of function as not limited  Increased pain with using (R) arm, reaching to side, dressing, lifting, with swimming, lying on (R) side.     Pain Rating:3  Pain rating at best: 2  Pain rating at worst: 5  Pain description: pain    Functional limitations are described as occurring with:   lifting  personal cares donning shirt or jacket  reaching overhead and to the side  performing routine daily activities  sports or recreation swimming    Patient reports benefit from:  heat, avoiding movement    Imaging results per Epic:  EXAM: XR SHOULDER RIGHT 2 OR MORE VWS  LOCATION: [facility]  DATE/TIME: 9/12/2019 11:03 AM     INDICATION: chronic right shoulder pain  COMPARISON: None.     IMPRESSION:   Negative shoulder. No fracture or dislocation    Past Medical History:   Diagnosis Date     Depression      Fainting       Past Surgical History:   Procedure Laterality Date     WISDOM TOOTH EXTRACTION       Patient Active Problem List   Diagnosis     Acid reflux          Objective:      Note: Items left blank indicates the item was not performed or not indicated at the time of the evaluation.    Patient Outcome Measures :    Shoulder Pain and Disability Index (SPADI) in %: 39 (51/130)     Scores range from 0-100%, where a score of 0% represents minimal pain and maximal function. The minimal clincically important difference is a score reduction of 10%.    Shoulder Examination  1. Chronic right shoulder pain       Precautions/Restrictions: None  Involved side: Right  Posture Observation:      General sitting posture is  normal.  General standing posture is fair.  Shoulder/Thoracic complex: Moderate bilateral scapular protraction   Mild bilateral scapular winging  Mildly increased CT junction thoracic kyphosis      Shoulder/Elbow ROM    Date:      Shoulder and Elbow ROM ( )   AROM in degrees AROM in degrees AROM in degrees    Right Left Right Left Right Left   Shoulder Flexion (0-180 ) 139 (+) ERP        Shoulder Abduction (0-180 ) 154 (+) ERP        Shoulder Extension (0-60 )         Shoulder ER (0-90 ) 77        Shoulder IR (0-70 )         Shoulder IR/Ext To T7-8 ERP To upper thoracic       Elbow Flexion (150 )         Elbow Extension (0 )          PROM in degrees PROM in degrees PROM in degrees    Right Left Right Left Right Left   Shoulder Flexion (0-180 ) 150 ERP        Shoulder Abduction (0-180 ) 157 ERP        Shoulder Extension (0-60 )         Shoulder ER (0-90 ) 69 (+)        Shoulder IR (0-70 ) WNL        Elbow Flexion (150 )         Elbow Extension (0 )           Shoulder/Elbow Strength  Date:      Shoulder/Elbow Strength (/5)  Manual Muscle Test (MMT) MMT MMT MMT    Right Left Right Left Right Left   Shoulder Flexion 5 (+)        Supraspinatus 5 (+) elbow        Shoulder Abduction 5        Shoulder Extension        "  Shoulder External Rotation 5        Shoulder Internal Rotation 5        Elbow Flexion 5        Elbow Extension 5        Other:         Other:           Shoulder Special Tests     Impingement Cluster Right (+/-) Left (+/-) Rotator Cuff Tests Right (+/-) Left (+/-)   Yates-Addy pos  Drop Arm Sign     Painful Arc neg  Hornblowers     Infraspinatus Test   ERLS     AC Tests Right (+/-) Left (+/-) IRLS     Active Compression   Labral Tests Right (+/-) Left (+/-)   Crossbody Adduction pos  Biceps Load Test II     AC Resisted Extension   Jerk Test     GH Instability Tests Right (+/-) Left (+/-) Renay Test     Sulcus Sign   Biceps Tests Right (+/-) Left (+/-)   Apprehension   Speed     Relocation   Andre casiano     Other:   Other:     Other:   Other:       Palpation:  mod tenderness of (R) post acromium, (R) greater/lesser humeral tuberosity, mild tenderness (R) infraspinatus, (R) wrist flexor tendons.     Exercises:  Exercise #1: scap retr  Comment #1: 10 x 5\"  Exercise #2: rows  Comment #2: 10 x orange  Exercise #3: ER/IR w/band  Comment #3: 10 x orange ea      Treatment Today    TREATMENT MINUTES COMMENTS   Evaluation 35 Plan of care and goals developed in collaboration with patient.   Discussed findings/condition, related anatomy.   Self-care/ Home management     Manual therapy 10 Induction, indirect, direct techniques utilized as appropriate for optimal tissue release.   MFR/SCS - ACP(C)-MS, (R) HET(P), (R) HUMAC(P),    Neuromuscular Re-education     Therapeutic Activity     Therapeutic Exercises 10 Exercises per flow sheet.    Gait training     Modality__________________                Total 55    Blank areas are intentional and mean the treatment did not include these items.     PT Evaluation Code: (Please list factors)  Patient History/Comorbidities: none  Examination: 2  Clinical Presentation: stable  Clinical Decision Making: low    Patient History/  Comorbidities Examination  (body structures and functions, " activity limitations, and/or participation restrictions) Clinical Presentation Clinical Decision Making (Complexity)   No documented Comorbidities or personal factors 1-2 Elements Stable and/or uncomplicated Low   1-2 documented comorbidities or personal factor 3 Elements Evolving clinical presentation with changing characteristics Moderate   3-4 documented comorbidities or personal factors 4 or more Unstable and unpredictable High                Tashia Cerrato PT  2/28/2020

## 2021-06-06 NOTE — PROGRESS NOTES
[facility] Daily Progress     Patient Name: Tamera Spence  Date: 3/10/2020  Visit #2/12  Referral Diagnosis: Chronic right shoulder pain  Referring provider: Allan To MD  Visit Diagnosis:     ICD-10-CM    1. Chronic right shoulder pain  M25.511     G89.29          Assessment:     HEP/POC compliance is  fair .  Patient demonstrates understanding/independence with home program.  Response to Intervention fair  Patient is benefitting from skilled physical therapy and is making steady progress toward functional goals.  Patient is appropriate to continue with skilled physical therapy intervention, as indicated by initial plan of care.    Goal Status:  Pt. will demonstrate/verbalize independence in self-management of condition in : 6 weeks  Pt. will be independent with home exercise program in : 6 weeks  Patient will return to: exercise;sport;in 6 weeks;Comment  Comment: able to swim using (R) arm without pain  Patient will reach / maintain arm movement: overhead;for dressing;with full ROM;with no pain;in 6 weeks;Comment;for home chores  Comment: and to the side  Patient will perform repetitive movement in : arm;with no pain;in 6 weeks;Comment  Comment: for exercise and ADLs    No data recorded    Plan / Patient Education:     Continue with initial plan of care.  Progress with home program as tolerated.    Subjective:     Pain Ratin  Shoulder is feeling better. She is not noticing it as much, is able to sleep on (R) side more comfortably. Continues to have medial elbow pain. Exercise compliance has not been good - has done the band exercises about 4x.     Objective:     Cranial scan is (+) for (R) shoulder periosteal, cartilage,   Mod tenderness of (R) ant glenohum jt line, (R) lesser tuberosity, ()R medial elbow    Exercises:  Exercise #1: scap retr  Comment #1: rev  Exercise #2: rows  Comment #2: 15 x orange  Exercise #3: ER/IR w/band  Comment #3: 15 x orange ea  Exercise #4:  "scaption  Comment #4: next  Exercise #5: medial elbow stretch  Comment #5: 3 x 20-30\"      Treatment Today     TREATMENT MINUTES COMMENTS   Evaluation     Self-care/ Home management     Manual therapy 15 Induction, indirect, direct techniques utilized as appropriate for optimal tissue release.   MFR/SCS - (R) SLAPA(C)-MS, (R) HET(P), MFR to (R) medial elbow/wrist flexors   Neuromuscular Re-education     Therapeutic Activity     Therapeutic Exercises 10 Exercises per flow sheet.    Gait training     Modality__________________                Total 25    Blank areas are intentional and mean the treatment did not include these items.       Tashia Cerrato  3/10/2020    "

## 2021-06-07 NOTE — PROGRESS NOTES
[facility] Daily Progress     Tamera Spence is a 49 y.o. female who is being seen via a billable video visit.  Patient has given verbal consent for video visit? Yes  Video Start Time: 9:50  Telehealth Visit Details:  Type of service: Telehealth  Video End Time (time video stopped): 10:08  Originating Location (Patient): Home  Additional Participants in Telehealth Visit: none  Distant Location (Provider Location): [facility]  Mode of Communication: Audio Visual    Tashia Cerrato, PT  4/15/2020    Patient Name: Tamera Spence  Date: 4/15/2020  Visit #4/12  Referral Diagnosis: Chronic right shoulder pain  Referring provider: Allan To MD  Visit Diagnosis:     ICD-10-CM    1. Chronic right shoulder pain  M25.511     G89.29          Assessment:     HEP/POC compliance is  good .  Patient demonstrates understanding/independence with home program.  Response to Intervention good. Patient is reporting decreased pain, increased ROM and function.   Patient is benefitting from skilled physical therapy and is making steady progress toward functional goals.  Patient is appropriate to continue with skilled physical therapy intervention, as indicated by initial plan of care.    Goal Status:  Pt. will demonstrate/verbalize independence in self-management of condition in : 6 weeks;Progressing toward  Pt. will be independent with home exercise program in : 6 weeks;Progressing toward  Patient will return to: exercise;sport;in 6 weeks  Comment: able to swim using (R) arm without pain   status: unable to swim as gym is closed due to covid 19  Patient will reach / maintain arm movement: overhead;for dressing;with full ROM;with no pain;in 6 weeks;Progressing toward  Comment: and to the side  Patient will perform repetitive movement in : arm;with no pain;in 6 weeks;Comment;Progressing toward  Comment: for exercise and ADLs    No data recorded    Plan / Patient Education:     Continue  with initial plan of care.  follow up phone call in 2 weeks.     Subjective:     Pain Ratin with movement  Pt is doing much better. Flexion/elevation ROM is improved. Mid range pain with abduction. Pain with sleeping on (R) side, reaching behind head to do a pony tail. Dressing is less painful. Some crepitus with lying on side.   She has increased her repetitions with exercises the last couple days since talking to the PT.       Objective:     Shoulder AROM in flexion appears WFL.   Abduction ROM is WFL with painful arc.     Exercises:  Exercise #1: scap retr  Comment #1: rev  Exercise #2: rows  Comment #2: rev step back to increase resistance, incr reps to 20   Exercise #3: ER/IR w/band  Comment #3: rev increase to 20 reps  Exercise #4: scaption  Comment #4: incr to 20 reps, incr wt to 1.5-2#  Exercise #6: shoulder ext leaning on table  Comment #6: instruct 10-15 x 1-2#        Treatment Today     TREATMENT MINUTES COMMENTS   Evaluation     Self-care/ Home management     Manual therapy     Neuromuscular Re-education     Therapeutic Activity     Therapeutic Exercises 18 Discussed exercise progression increasing reps, weights or resistance.  Instructed in shoulder ext isotonic  Exercises per flow sheet.    Gait training     Modality__________________                Total 18    Blank areas are intentional and mean the treatment did not include these items.       Tashia Cerrato  4/15/2020     [facility] Discharge Summary  Patient Name: Tamera Spence  Date: 7/10/2020  Referral Diagnosis: Chronic right shoulder pain  Referring provider: Allan To MD  Visit Diagnosis:   1. Chronic right shoulder pain         Goals:  Pt. will demonstrate/verbalize independence in self-management of condition in : 6 weeks;Progressing toward  Pt. will be independent with home exercise program in : 6 weeks;Progressing toward  Patient will return to: exercise;sport;in 6 weeks  Comment: able to swim using (R) arm  without pain   status: unable to swim as gym is closed due to covid 19  Patient will reach / maintain arm movement: overhead;for dressing;with full ROM;with no pain;in 6 weeks;Progressing toward  Comment: and to the side  Patient will perform repetitive movement in : arm;with no pain;in 6 weeks;Comment;Progressing toward  Comment: for exercise and ADLs    No data recorded    Patient was seen for 4 visits from 2/28/20 to 4/15/20 with no missed appointments.  Patient received a home program for ROM and strength.    Unable to continue with in person PT due to Covid 19.   Patient reported continued improvement on phone follow up on 4/30.   Patient's current objective and goal status is not known.       Therapy will be discontinued at this time.  The patient will need a new referral to resume.    Thank you for your referral.  Tashia Cerrato  7/10/2020  8:44 AM

## 2021-06-08 NOTE — PROGRESS NOTES
"  Office Visit -    Tamera Spence   49 y.o. female    Date of Visit: 6/3/2020         Assessment and Plan   1. Vaginal discharge  Most likely Bacterial vaginosis . Due to h/o unprotected sex will check for other STD's.   - Chlamydia trachomatis & Neisseria gonorrhoeae, Amplified Detection  - HIV Antigen/Antibody Screening Cascade  - Hepatitis C Antibody (Anti-HCV)  - Wet Prep, Vaginal  - Gynecologic Cytology (PAP Smear)  - metroNIDAZOLE (FLAGYL) 500 MG tablet; Take 1 tablet (500 mg total) by mouth 2 (two) times a day for 7 days.  Dispense: 21 tablet; Refill: 0    2. Encounter for IUD insertion  Could not clearly see the IUD string . Will get US to check placement   - US Pelvis With Transvaginal Non OB; Future            As needed      History of Present Illness   This 49 y.o. old   Chief Complaint   Patient presents with     Vaginal Discharge     Vaginal discharge and itching x 3 days, had unprotective sex        Uses female condoms . Did advise that they are not as effective as male condoms to prevent STDS . She has a mirena IUD for birth control . Last pap was done in 2017 . Has had multiple partners in the last 6 months . In jan STD check was negative otherwise healthy   Review of Systems: A comprehensive review of systems was negative except as noted.     Medications, Allergies and Problem List   Reviewed, reconciled and updated  Patient Active Problem List   Diagnosis     Acid reflux     Polyp of duodenum        Physical Exam   General Appearance:       /66 (Patient Site: Right Arm, Patient Position: Sitting, Cuff Size: Adult Regular)   Pulse 84   Ht 5' 11\" (1.803 m)   Wt 186 lb (84.4 kg)   SpO2 98%   BMI 25.94 kg/m      Abdomen - soft, nontender, nondistended, no masses or organomegaly    Pelvic - normal external genitalia, vulva, vagina, cervix, uterus and adnexa, PAP: Pap smear done today, copious yellow discharge , inflamed cervix no string visible   Musculoskeletal - no joint tenderness, " deformity or swelling  Extremities - peripheral pulses normal, no pedal edema, no clubbing or cyanosis  Skin - normal coloration and turgor, no rashes, no suspicious skin lesions noted                                                                                       Additional Information   Current Outpatient Medications   Medication Sig Dispense Refill     citalopram (CELEXA) 20 MG tablet Take 20 mg by mouth as needed.       metroNIDAZOLE (FLAGYL) 500 MG tablet Take 1 tablet (500 mg total) by mouth 2 (two) times a day for 7 days. 21 tablet 0     No current facility-administered medications for this visit.      No Known Allergies  Social History     Social History Narrative    . She has son (tranistioned from girl to boy), Anthony (2009), and daughter Mariya (2006). 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  She is originally from [place], [place], and attended college at the [employer] and [employer].       reports that she has never smoked. She has never used smokeless tobacco. She reports current alcohol use. She reports that she does not use drugs.   Past Medical History:   Diagnosis Date     Depression      Fainting             Marj Delgadillo MD

## 2021-06-08 NOTE — TELEPHONE ENCOUNTER
Did you want Flagyl changed to a qty#14 or was the sig supposed to be changed? Please clarify and we will notify pharmacy

## 2021-06-08 NOTE — TELEPHONE ENCOUNTER
FYI - Status Update  Who is Calling: Patient  Update: Patient calling back to check on status of the message below. Writer phone in ok to dispense QTY 14 Take 1 tablet (500 mg total) by mouth 2 (two) times a day for 7 days.  Okay to leave a detailed message?:  No return call needed

## 2021-06-08 NOTE — TELEPHONE ENCOUNTER
Medication Question or Clarification  Who is calling: Pharmacy  What medication are you calling about (include dose and sig)?: metroNIDAZOLE (FLAGYL) 500 MG tablet  Who prescribed the medication?: Marj Delgadillo  What is your question/concern?: Pharmacy states instructions vs. Quantity don't add up.  Please call to clarify.  Requested Pharmacy: [facility]  Okay to leave a detailed message?: Yes

## 2021-06-08 NOTE — TELEPHONE ENCOUNTER
Refill Request  Did you contact pharmacy: Yes  Medication name:   Requested Prescriptions     Pending Prescriptions Disp Refills     metroNIDAZOLE (FLAGYL) 500 MG tablet 21 tablet 0     Sig: Take 1 tablet (500 mg total) by mouth 2 (two) times a day for 7 days.     Who prescribed the medication: metroNIDAZOLE (FLAGYL) 500 MG tablet  Requested Pharmacy: [facility] #[id number]  Is patient out of medication: Preferred pharmacy closed due to recent activites in the area- please send prescription to pended pharmacy.  Patient notified refills processed in 3 business days:  no  Okay to leave a detailed message: Yes  [phone]    FYI: Preferred pharmacy closed due to recent activites in the area- please send prescription to pended pharmacy.

## 2021-06-08 NOTE — TELEPHONE ENCOUNTER
Vaginal itching.  Has mustard yellow discharge X3 days, no odor.  No fever, no pain, no cramping.  Transferred to scheduling, also advised [email or web address].    Reason for Disposition    Abnormal color vaginal discharge (i.e., yellow, green, gray)    Protocols used: VAGINAL [id number]

## 2021-06-08 NOTE — TELEPHONE ENCOUNTER
Who is calling:  Patient   Reason for Call:  Patient is calling for follow up on below medication questions about metronidazole dispense quantity  Patient requested a call from clinic when prescription is sent to pharmacy per patient earlier today she requested to send prescription to different [facility] location but her preferred pharmacy is open today requested to [facility] # [id number]  Date of last appointment with primary care: 06/03/20  Okay to leave a detailed message: No

## 2021-06-08 NOTE — TELEPHONE ENCOUNTER
Spoke with the pharmacy and relayed message below from Dr. Delgadillo.  They verbalized understanding and had no further questions at this time.  Deysi JAUREGUI CMA/JOSE ALFREDO....................3:26 PM

## 2021-06-11 NOTE — PROGRESS NOTES
Office Visit - Physical   Tamera Spence   46 y.o.  female    Date of visit: 7/5/2017  Physician: Allan To MD     Assessment and Plan   1. Routine general medical examination at a health care facility  This is a healthy 46-year-old woman.  Mammogram is up-to-date.  Pap smear performed today.  Checking labs as below.  Healthy diet regular exercise and healthy lifestyle recommended    2. Screening for diabetes mellitus    3. Screening for hyperlipidemia  - Lipid Cascade    4. Screening for cervical cancer  - Gynecologic Cytology (PAP Smear)    5. Dyspepsia  Long-standing dyspepsia responsive to PPIs but she does not really take them.  She associates this with posture.  She has had a swallow evaluation and esophagram which were normal back in 2011.  We will have her get an upper endoscopy and I encouraged her to consider daily use of PPI  - HM2(CBC w/o Differential)  - Comprehensive Metabolic Panel  - Ambulatory referral for Upper GI Endoscopy    The following high BMI interventions were performed this visit: encouragement to exercise and lifestyle education regarding diet    Return in about 1 year (around 7/5/2018) for annual physical.     Chief Complaint   Chief Complaint   Patient presents with     Annual Exam     Fasting        Patient Profile   Social History     Social History Narrative    She lives with her wife, Nevin in [facility]. She has son (tranistioned from girl to boy), Anthony (2009), and daughter Mariya (2006). 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 Nevin is a psychologist in the [employer]. She is originally from [place], [place], and attended college at the [employer] and [employer].        Past Medical History   Patient Active Problem List   Diagnosis     Myofascial Pain Syndrome     Bad Breath     Neck pain       Past Surgical History  She has a past surgical history that includes Slater  "tooth extraction.     History of Present Illness   This 46 y.o. old woman comes in for annual physical and for evaluation of dyspepsia.  Her medical history was reviewed, electronic medical record is updated and it is reflected in this note.  Overall she is feeling well.  She continues to feel nauseated about 4 days a month.  She associates this with poor posture.  She tried omeprazole and this did seem to help but she has not continued.  She saw Dr. Dan Sipple, spine clinic who felt that her dyspepsia is likely unrelated to her posture.  She has not had significant weight changes noted vomiting.  No change in her stool no abdominal pain.  No night sweats.  Otherwise, she has seen an eye doctor and she has a early cataract in the right eye.    Review of Systems: A comprehensive review of systems was negative except as noted.     Medications and Allergies   Current Outpatient Prescriptions   Medication Sig Dispense Refill     omeprazole (PRILOSEC) 20 MG capsule Take 1 capsule (20 mg total) by mouth daily. 90 capsule 3     No current facility-administered medications for this visit.      No Known Allergies     Family and Social History   Family History   Problem Relation Age of Onset     Hypertension Mother      No Medical Problems Father      No Medical Problems Sister      No Medical Problems Son      No Medical Problems Daughter      No Medical Problems Sister         Social History   Substance Use Topics     Smoking status: Never Smoker     Smokeless tobacco: Never Used     Alcohol use Yes      Comment: 1/month        Physical Exam   General Appearance:   No acute distress    /70 (Patient Site: Right Arm, Patient Position: Sitting, Cuff Size: Adult Regular)  Pulse 79  Ht 5' 11\" (1.803 m)  Wt 179 lb (81.2 kg)  LMP 06/20/2017  SpO2 99%  BMI 24.97 kg/m2    EYES: Eyelids, conjunctiva, and sclera were normal. Pupils were normal. Cornea, iris, and lens were normal bilaterally.  HEAD, EARS, NOSE, MOUTH, AND " THROAT: Head and face were normal. Hearing was normal to voice and the ears were normal to external exam. Nose appearance was normal and there was no discharge. Oropharynx was normal.  NECK: Neck appearance was normal. There were no neck masses and the thyroid was not enlarged.  RESPIRATORY: Breathing pattern was normal and the chest moved symmetrically.  Percussion/auscultatory percussion was normal.  Lung sounds were normal and there were no abnormal sounds.  CARDIOVASCULAR: Heart rate and rhythm were normal.  S1 and S2 were normal and there were no extra sounds or murmurs. Peripheral pulses in arms and legs were normal.  Jugular venous pressure was normal.  There was no peripheral edema.  GASTROINTESTINAL: The abdomen was normal in contour.  Bowel sounds were present.  Percussion detected no organ enlargement or tenderness.  Palpation detected no tenderness, mass, or enlarged organs.   MUSCULOSKELETAL: Skeletal configuration was normal and muscle mass was normal for age. Joint appearance was overall normal.  LYMPHATIC: There were no enlarged nodes.  SKIN/HAIR/NAILS: Skin color was normal.  There were no skin lesions.  Hair and nails were normal.  NEUROLOGIC: The patient was alert and oriented to person, place, time, and circumstance. Speech was normal. Cranial nerves were normal. Motor strength was normal for age. The patient was normally coordinated.  PSYCHIATRIC:  Mood and affect were normal and the patient had normal recent and remote memory. The patient's judgment and insight were normal.  GENITAL/URINARY: Normal pelvic exam including cervix, Pap smear performed      Additional Information        Allan To MD  Internal Medicine  Contact me at [phone]

## 2021-06-12 NOTE — PROGRESS NOTES
[facility]   Shoulder Initial Evaluation    Patient Name: Tamera Spence  Date of evaluation: 10/20/2020  Visit #1/up to 12  Referral Diagnosis: Chronic right shoulder pain  Referring provider: Allan To MD  Visit Diagnosis:     ICD-10-CM    1. Chronic right shoulder pain  M25.511     G89.29    2. Impingement syndrome of right shoulder  M75.41        Assessment:   Tamera Spence is a 49 y.o. female who presents to therapy today with chief complaints of (R) shoulder pain. Onset date of sx was 4/2019.  Functional impairments include reaching, dressing, personal cares.  Clinical findings include mild scapular winging, decreased shoulder A/PROM with pain, pain with resisted testing , pain with provocation tests, tenderness of ant shoulder/humeral head.        Pt. is appropriate for skilled PT intervention as outlined in the Plan of Care (POC).  Pt. is a good candidate for skilled PT services to improve pain levels and function.    Goals:  Pt. will demonstrate/verbalize independence in self-management of condition in : 6 weeks  Pt. will be independent with home exercise program in : 6 weeks    Pt will: be able to don jacket without shoulder pain in 6 weeks.  Pt will: be able to reach into overhead shelf without shoulder pain in 6 weeks.      Patient's expectations/goals are realistic.    Barriers to Learning or Achieving Goals:  No Barriers.       Plan / Patient Instructions:        Plan of Care:   Authorization / Certification Number of Visits: [facility] [facility] [employer]  Communication with: Referral Source  Patient Related Instruction: Nature of Condition;Treatment plan and rationale;Basis of treatment;Body mechanics;Posture;Next steps  Times per Week: 1-2  Number of Weeks: 4-6  Number of Visits: up to 12 PRN  Discharge Planning: HEP, self management  Precautions / Restrictions : none  Therapeutic Exercise: ROM;Stretching;Strengthening  Neuromuscular Reeducation: posture;kinesio  tape  Manual Therapy: myofascial release;strain counterstrain      POC and pathology of condition were reviewed with patient.  Pt. is in agreement with the Plan of Care  A Home Exercise Program (HEP) was initiated today.     Plan for next visit: manual therapy, progression of home program.     .   Subjective:       Social information:   Living Situation:lives with others    Occupation:researcher    Work Status:Working full time   Equipment Available: None    History of Present Illness:    Tamera is a 49 y.o. female who presents to therapy today with complaints of (R) sup shoulder and proximal arm pain. Date of onset/duration of symptoms is 2019. Onset was insidious. Symptoms are intermittent and getting worse. She reports  A chronic  history of similar symptoms. She describes their previous level of function as not limited  She had some PT in spring 2020, unable to continue due to Covid 19 restrictions. She reported significant improvement, but stopped doing the exercises and lost her resistance band, then pain worsened again.   Pain is worse with movement.     Pain Ratin  Pain rating at best: 2  Pain rating at worst: 7  Pain description: pain    Functional limitations are described as occurring with:   personal cares donning shirt or jacket, combing hair and washing hair  reaching overhead and to the side  lying on side   Swimming - has to modify stroke    Patient reports benefit from:  physical therapy    Past Medical History:   Diagnosis Date     Depression      Fainting      Past Surgical History:   Procedure Laterality Date     WISDOM TOOTH EXTRACTION       Patient Active Problem List   Diagnosis     Acid reflux     Polyp of duodenum          Objective:      Note: Items left blank indicates the item was not performed or not indicated at the time of the evaluation.    Patient Outcome Measures :    Shoulder Pain and Disability Index (SPADI) in %: 57 (63/110)     Scores range from 0-100%, where a score of 0%  represents minimal pain and maximal function. The minimal clincically important difference is a score reduction of 10%.    Shoulder Examination  1. Chronic right shoulder pain     2. Impingement syndrome of right shoulder       Precautions/Restrictions: None  Involved side: Right  Posture Observation:      General standing posture is normal.  Shoulder/Thoracic complex: Moderate bilateral scapular protraction   Mild bilateral scapular winging      Shoulder/Elbow ROM    Date:      Shoulder and Elbow ROM ( )   AROM in degrees AROM in degrees AROM in degrees    Right Left Right Left Right Left   Shoulder Flexion (0-180 ) 135 (+)        Shoulder Abduction (0-180 ) 148 (+) mid/end range pain        Shoulder Extension (0-60 )         Shoulder ER (0-90 ) 77        Shoulder IR (0-70 )         Shoulder IR/Ext To T9 (+)        Elbow Flexion (150 )         Elbow Extension (0 )          PROM in degrees PROM in degrees PROM in degrees    Right Left Right Left Right Left   Shoulder Flexion (0-180 ) 153 (+)        Shoulder Abduction (0-180 ) 155 (+)        Shoulder Extension (0-60 )         Shoulder ER (0-90 ) 82        Shoulder IR (0-70 ) WNL        Elbow Flexion (150 )         Elbow Extension (0 )           Shoulder/Elbow Strength  Date:      Shoulder/Elbow Strength (/5)  Manual Muscle Test (MMT) MMT MMT MMT    Right Left Right Left Right Left   Shoulder Flexion 5 (+)        Supraspinatus 5 (+)        Shoulder Abduction 5        Shoulder Extension         Shoulder External Rotation 5        Shoulder Internal Rotation 5 (+)        Elbow Flexion         Elbow Extension         Other:         Other:           Shoulder Special Tests     Impingement Cluster Right (+/-) Left (+/-) Rotator Cuff Tests Right (+/-) Left (+/-)   Yates-Addy pos  Drop Arm Sign     Painful Arc Mildly (+) has mid range pain  Hornblowers     Infraspinatus Test   ERLS     AC Tests Right (+/-) Left (+/-) IRLS     Active Compression   Labral Tests Right (+/-)  "Left (+/-)   Crossbody Adduction pos  Biceps Load Test II     AC Resisted Extension   Jerk Test     GH Instability Tests Right (+/-) Left (+/-) Renay Test     Sulcus Sign   Biceps Tests Right (+/-) Left (+/-)   Apprehension   Speed     Relocation   Andre casiano     Other:   Other:     Other:   Other:       Flexibility:     Palpation: Mild/mod tenderness (R) SBA-LV, greater tuberosity.     Treatment Today    TREATMENT MINUTES COMMENTS   Evaluation 30 Plan of care and goals developed in collaboration with patient.   Discussed findings/condition, related anatomy.   Self-care/ Home management     Manual therapy 10 Induction, indirect, direct techniques utilized as appropriate for optimal tissue release.   MFR/SCS - (R) SBA-LV, (R) HUMAC(P),    Neuromuscular Re-education     Therapeutic Activity     Therapeutic Exercises 10 Exercises per flow sheet.   Exercises:  Exercise #1: scap retraction  Comment #1: 10 x 5\"  Exercise #2: rows  Comment #2: 10 x orange  Exercise #3: ER/IR w/band  Comment #3: 10 x orange each     Gait training     Modality__________________                Total 50    Blank areas are intentional and mean the treatment did not include these items.     PT Evaluation Code: (Please list factors)  Patient History/Comorbidities: none  Examination: 2  Clinical Presentation: evolving, getting worse  Clinical Decision Making: low    Patient History/  Comorbidities Examination  (body structures and functions, activity limitations, and/or participation restrictions) Clinical Presentation Clinical Decision Making (Complexity)   No documented Comorbidities or personal factors 1-2 Elements Stable and/or uncomplicated Low   1-2 documented comorbidities or personal factor 3 Elements Evolving clinical presentation with changing characteristics Moderate   3-4 documented comorbidities or personal factors 4 or more Unstable and unpredictable High     [facility] Discharge Summary  Patient Name: Tamera DUTTA" Jordy  Date: 12/10/2020  Referral Diagnosis: Chronic right shoulder pain  Referring provider: Allan To MD  Visit Diagnosis:   1. Chronic right shoulder pain     2. Impingement syndrome of right shoulder         Goals:  Pt. will demonstrate/verbalize independence in self-management of condition in : 6 weeks  Pt. will be independent with home exercise program in : 6 weeks    Pt will: be able to don jacket without shoulder pain in 6 weeks.  Pt will: be able to reach into overhead shelf without shoulder pain in 6 weeks.      Patient was seen for 1 visit on 10/20/20 with no missed appointments.  Patient was seen for re-evaluation of shoulder pain. She was previously seen in spring 2020, but was unable to continue PT due to covid 19 restrictions.   Exercise program was reviewed and issued a new resistance band.  Patient did not return for follow up.   Patient's current objective and goal status is not known.       Therapy will be discontinued at this time.  The patient will need a new referral to resume.    Thank you for your referral.  Tashia Cerrato  12/10/2020  11:21 AM           Tashia Cerrato  10/20/2020  8:27 AM

## 2021-06-14 NOTE — PROGRESS NOTES
Office Visit - Follow Up   Tamera Spence   47 y.o. female    Date of Visit: 12/1/2017    Chief Complaint   Patient presents with     Insomnia     Results     EGD        Assessment and Plan   1. Primary insomnia  I recommended online insomnia related cognitive behavioral program.  Additionally will use trazodone as this is been effective for her in the past.    2. Dyspepsia  Reviewed her EGD, lab testing and consultative notes.  I recommended that she either use a PPI which she does not want to do, H2 blocker which she will consider or as needed Tums.  I also recommended she strengthen her core and improve her posture as this does seem to exacerbate her symptoms    Return in about 4 weeks (around 12/29/2017) for recheck.     History of Present Illness   This 47 y.o. old woman comes in for evaluation of insomnia as well as dyspepsia.  She is currently getting .  She is finding that she is having a hard time sleeping.  She usually falls asleep without difficulty but wakes up at 2 AM and cannot get back to sleep.  She has a lot of things on her mind.  She is looking for a new place to live.  There are financial stresses.  She has young children.  Additionally, she has a history of depression.  She is previously taking trazodone to help with sleep and this was effective.  She is not currently feeling too depressed.  No panic attacks.  Some anxiety during the day.  She does have some dyspepsia which is generally mild and chronic.  She responded to PPIs in the past.  She had an EGD which was generally unremarkable, some benign gastric polyps which were biopsied.  She saw a spine specialist because she found with postural and he told her was not postural.  She still thinks that when she bends over she has more stomach pain.    Review of Systems: A comprehensive review of systems was negative except as noted.     Medications, Allergies and Problem List   Reviewed and updated     Physical Exam   General  "Appearance:   No acute distress    /80 (Patient Site: Right Arm, Patient Position: Sitting, Cuff Size: Adult Regular)  Pulse 75  Ht 5' 11\" (1.803 m)  Wt 179 lb (81.2 kg)  SpO2 98%  BMI 24.97 kg/m2         Additional Information   Current Outpatient Prescriptions   Medication Sig Dispense Refill     omeprazole (PRILOSEC) 20 MG capsule Take 1 capsule (20 mg total) by mouth daily. 90 capsule 3     traZODone (DESYREL) 50 MG tablet Take 1-2 tablets ( mg total) by mouth at bedtime. 30 tablet 11     No current facility-administered medications for this visit.      No Known Allergies  Social History   Substance Use Topics     Smoking status: Never Smoker     Smokeless tobacco: Never Used     Alcohol use Yes      Comment: 1/month       Review and/or order of clinical lab tests:  Review and/or order of radiology tests:  Review and/or order of medicine tests:  Discussion of test results with performing physician:  Decision to obtain old records and/or obtain history from someone other than the patient:  Review and summarization of old records and/or obtaining history from someone other than the patient and.or discussion of case with another health care provider:  Independent visualization of image, tracing or specimen itself:    Time:      Allan To MD  "

## 2021-06-17 NOTE — PROGRESS NOTES
Office Visit - Follow Up   Tamera Spence   50 y.o. female    Date of Visit: 5/5/2021    Chief Complaint   Patient presents with     Breast Mass        Assessment and Plan   1. Lump or mass in breast  I discussed with her that this will need evaluation if it is persistent.  We discussed relationship between coronavirus vaccine and lumps in the breast or axillary lymph nodes.  Therefore I recommended we follow this for the next 6 weeks and then she will be in contact with me if this is still present which is easily palpable for her.    2. Adverse effect of vaccine, initial encounter  As above    Return in about 6 weeks (around 6/16/2021) for recheck.     History of Present Illness   This 50 y.o. old woman comes in for evaluation of a lump in her left breast.  This started shortly after vaccination with coronavirus 2 weeks ago.  It is soft and mobile and generally nontender.  No axillary tenderness.  Her last mammogram was in 2018.    Review of Systems: A comprehensive review of systems was negative except as noted.     Medications, Allergies and Problem List   Reviewed, reconciled and updated  Post Discharge Medication Reconciliation Status:      Physical Exam   General Appearance:   No acute distress    /76 (Patient Site: Left Arm, Patient Position: Sitting, Cuff Size: Adult Regular)   Pulse 78   Wt 192 lb (87.1 kg)   SpO2 99%   BMI 26.78 kg/m      He does have a lump in her left breast just superior to her nipple.  This is soft and mobile and nontender.  No axillary lymphadenopathy.     Additional Information   Current Outpatient Medications   Medication Sig Dispense Refill     citalopram (CELEXA) 20 MG tablet Take 20 mg by mouth as needed.       No current facility-administered medications for this visit.      No Known Allergies  Social History     Tobacco Use     Smoking status: Never Smoker     Smokeless tobacco: Never Used   Substance Use Topics     Alcohol use: Yes     Comment: 1/month     Drug  use: No       Review and/or order of clinical lab tests:  Review and/or order of radiology tests:  Review and/or order of medicine tests:  Discussion of test results with performing physician:  Decision to obtain old records and/or obtain history from someone other than the patient:  Review and summarization of old records and/or obtaining history from someone other than the patient and.or discussion of case with another health care provider:  Independent visualization of image, tracing or specimen itself:    Time:      Allan To MD

## 2021-06-18 NOTE — PATIENT INSTRUCTIONS - HE
Patient Instructions by Tashia Cerrato PT at 4/15/2020 10:00 AM     Author: Tashia Cerrato PT Service: -- Author Type: Physical Therapist    Filed: 4/15/2020 10:41 AM Encounter Date: 4/15/2020 Status: Addendum    : Tashia Cerrato PT (Physical Therapist)    Related Notes: Original Note by Tashia Cerrato PT (Physical Therapist) filed at 4/15/2020 10:39 AM           Start with 10-15 reps with 1-2 pounds.   Progress to 20 reps.

## 2021-06-18 NOTE — PATIENT INSTRUCTIONS - HE
Patient Instructions by Tashia Cerrato PT at 10/20/2020  8:30 AM     Author: Tashia Cerrato PT Service: -- Author Type: Physical Therapist    Filed: 10/20/2020  9:24 AM Encounter Date: 10/20/2020 Status: Signed    : Tashia Cerrato PT (Physical Therapist)        SCAPULAR RETRACTIONS    Draw your shoulder blades back and down.    ELASTIC BAND ROWS     Holding elastic band with both hands, draw back the band as you bend your elbows. Keep your elbows near the side of your body.          ELASTIC BAND SHOULDER INTERNAL ROTATION    While holding an elastic band at your side with your elbow bent, start with your hand away from your stomach, then pull the band towards your stomach. Keep your elbow near your side the entire time.    Do 10-15 repetitions.      ELASTIC BAND SHOULDER EXTERNAL ROTATION    While holding an elastic band at your side with your elbow bent, start with your hand near your stomach and then pull the band away. Keep your elbow at your side the entire time.    Do 10-15 repetitions.

## 2021-06-19 NOTE — PROGRESS NOTES
ASSESSMENT AND PLAN:    1. Stye  No clinical conjunctivitis. Will treat with 10% sulfacetimide optic one drop four times a day for 5 days, warm compresses.  Follow up if fails to improve.  Return to contacts when resolved.     CHIEF COMPLAINT:  Chief Complaint   Patient presents with     Stye     noticed stye on Saturday night, states it has been getting worse over the past couple of days.      HISTORY OF PRESENT ILLNESS:  Tamera Spence is a 47 y.o. female with a stye on the right eye.  No fever, the left eye is OK.     Active Ambulatory Problems     Diagnosis Date Noted     Myofascial Pain Syndrome      Bad Breath      Neck pain 02/16/2015     Resolved Ambulatory Problems     Diagnosis Date Noted     No Resolved Ambulatory Problems     Past Medical History:   Diagnosis Date     Depression      Fainting      Past Surgical History:   Procedure Laterality Date     WISDOM TOOTH EXTRACTION       VITALS:  Vitals:    07/02/18 1613   BP: 120/70   Patient Site: Left Arm   Patient Position: Sitting   Cuff Size: Adult Regular   Pulse: 76   SpO2: 99%   Weight: 176 lb 14.4 oz (80.2 kg)     Wt Readings from Last 3 Encounters:   07/02/18 176 lb 14.4 oz (80.2 kg)   12/01/17 179 lb (81.2 kg)   07/05/17 179 lb (81.2 kg)     PHYSICAL EXAM:  Constitutional:  Well appearing in NAD, alert and oriented  Eyes - right eye lower lid stye with mild injection, no overt conjunctivitis.     Current Outpatient Prescriptions   Medication Sig Dispense Refill     omeprazole (PRILOSEC) 20 MG capsule Take 1 capsule (20 mg total) by mouth daily. 90 capsule 3     pantoprazole (PROTONIX) 20 MG tablet Take by mouth.       traZODone (DESYREL) 50 MG tablet Take 1-2 tablets ( mg total) by mouth at bedtime. 30 tablet 11     sulfacetamide (BLEPH-10) 10 % ophthalmic solution One drop four times a day for 5 days, right eye 5 mL 0     No current facility-administered medications for this visit.      Dennys Mejia MD  Internal  Medicine  [facility]

## 2021-06-20 NOTE — LETTER
Letter by Allan To MD at      Author: Allan To MD Service: -- Author Type: --    Filed:  Encounter Date: 2/28/2020 Status: (Other)        Formerly [facility] INTERNAL MEDICINE  [address] [place] [address]  [facility] [address]  [phone]         Tamera Spence  [address] [place] [address]        02/28/20    Dear Tamera Spence,     At [facility] we care about your health and well-being. Your primary care provider is committed to ensuring you receive high quality care and has chosen a network of specialists to assist in providing that care. Recently Dr. To referred you to Radiology for a Mammogram.      Please call [phone] at your earliest convenience for assistance in scheduling an appointment.  If you have already scheduled this appointment, please disregard this notice.  Thank you for choosing [facility] for your healthcare needs.       Sincerely,       [facility] Specialty Scheduling

## 2021-06-20 NOTE — PROGRESS NOTES
"  Office Visit - Follow Up   Tamera Spence   47 y.o. female    Date of Visit: 9/7/2018    Chief Complaint   Patient presents with     Insomnia        Assessment and Plan   1. Insomnia  We discussed sleep hygiene.  I recommend she shorten the time she is in bed.  Friends and she could go to bed at 10 or 11 PM and get out of bed at 6 AM.  She is not quite sure she wants to do this.  Will do short treatment with zolpidem.  Recommend that she take it before bed.  Alternatively, she could take it if she wakes up at 2 AM.  We discussed risks associated with medication including hypnotic effect.    2. Mild major depression (H)  She is on Celexa tolerating this and her mood is well controlled.    3. Visit for screening mammogram  - Mammo Screening Bilateral; Future    Return in about 4 weeks (around 10/5/2018) for recheck.     History of Present Illness   This 47 y.o. old comes in for evaluation of insomnia.  This is been provoked by divorce.  She is now living in a new house.  The house apparently has mold.  She is therefore sleeping with her 2 children on a Simon bed.  She has a lot of stress with settling the divorce as there is some issues with finances.  She is going to bed at 9 PM and waking up at about 2 AM and having a hard time going back to sleep.  She gets out of bed at 7 AM.  She had tried trazodone which helps her fall asleep but does not help her stay asleep.  Mood okay on the Celexa.  Had some issues with panic attacks specifically related to court hearings.    Review of Systems: A comprehensive review of systems was negative except as noted.     Medications, Allergies and Problem List   Reviewed, reconciled and updated     Physical Exam   General Appearance:   No acute distress    /66 (Patient Site: Left Arm, Patient Position: Sitting, Cuff Size: Adult Regular)  Pulse 81  Ht 5' 11\" (1.803 m)  Wt 169 lb (76.7 kg)  SpO2 99%  BMI 23.57 kg/m2    Pleasant mood and affect     Additional Information "   Current Outpatient Prescriptions   Medication Sig Dispense Refill     citalopram (CELEXA) 20 MG tablet Take 1 tablet (20 mg total) by mouth daily. 90 tablet 3     zolpidem (AMBIEN) 5 MG tablet Take 0.5-1 tablets (2.5-5 mg total) by mouth daily. 20 tablet 1     No current facility-administered medications for this visit.      No Known Allergies  Social History   Substance Use Topics     Smoking status: Never Smoker     Smokeless tobacco: Never Used     Alcohol use Yes      Comment: 1/month       Review and/or order of clinical lab tests:  Review and/or order of radiology tests:  Review and/or order of medicine tests:  Discussion of test results with performing physician:  Decision to obtain old records and/or obtain history from someone other than the patient:  Review and summarization of old records and/or obtaining history from someone other than the patient and.or discussion of case with another health care provider:  Independent visualization of image, tracing or specimen itself:    Time:      Allan To MD

## 2021-06-20 NOTE — LETTER
Letter by Allan To MD at      Author: Allan To MD Service: -- Author Type: --    Filed:  Encounter Date: 2/26/2020 Status: (Other)        Formerly [facility] INTERNAL MEDICINE  [address] [place] [address]  [facility] [address]  [phone]         Tamera Spence  [address] [place] [address]        02/26/20    Dear Tamera Spence,     At [facility] we care about your health and well-being. Your primary care provider is committed to ensuring you receive high quality care and has chosen a network of specialists to assist in providing that care. Recently Dr. To referred you to Physical Therapy  for specialty care.      Please call [phone] at your earliest convenience for assistance in scheduling an appointment.  If you have already scheduled this appointment, please disregard this notice.  Thank you for choosing [facility] for your healthcare needs.       Sincerely,       [facility] Specialty Scheduling

## 2021-06-23 NOTE — PROGRESS NOTES
"  Office Visit - Follow Up   Tamera Spence   48 y.o. female    Date of Visit: 2/7/2019    Chief Complaint   Patient presents with     Exposure to STD        Assessment and Plan   1. Screening for HIV (human immunodeficiency virus)  - HIV Antigen/Antibody Screening [place]    2. Screen for STD (sexually transmitted disease)  Discussed states that practices.  Discussed risk of pregnancy and sexually transmitted infections.  Discussed screening for cervical cancer and HPV.  - Syphilis Screen, [facility]  - Chlamydia trachomatis & Neisseria gonorrhoeae, Amplified Detection    3. Mild major depression (H)  Continue citalopram stable    4. Gastroesophageal reflux disease without esophagitis  Does not seem to be an issue currently has previously benefited from PPI    5. Primary insomnia  Continue trazodone    Return in about 6 months (around 8/7/2019) for recheck.     History of Present Illness   This 48 y.o. old comes in for follow-up of numerous medical problems and evaluation of possible sexual transmitted infection.  She is overall doing well.  Her divorce has been finalized.  She has finished a lot of work around her house and feels more stable.  She has had 2 new sexual partners, both man.  She has not use barrier protection.  She does have an IUD.  She has no known exposure to STI but is concerned.  No symptoms.  Mood better.  Sleeping better.  Not using Ambien.  Occasional trazodone.  Continues on citalopram.    Review of Systems: A comprehensive review of systems was negative except as noted.     Medications, Allergies and Problem List   Reviewed, reconciled and updated     Physical Exam   General Appearance:   No acute distress    /80 (Patient Site: Right Arm, Patient Position: Sitting, Cuff Size: Adult Regular)   Pulse 83   Ht 5' 11\" (1.803 m)   Wt 170 lb (77.1 kg)   SpO2 98%   BMI 23.71 kg/m      HEENT exam is unremarkable  Neck supple no thyromegaly or nodule palpable  Lymphatic no cervical " lymphadenopathy  Cardiovascular regular rate and rhythm no murmur gallop or rub  Pulmonary lungs are clear to auscultation bilaterally  Gastrointestinall abdomen soft nontender nondistended no organomegaly  Neurologic exam is non focal  Psychiatric pleasant, no confusion or agitation        Additional Information   Current Outpatient Medications   Medication Sig Dispense Refill     citalopram (CELEXA) 20 MG tablet Take 1 tablet (20 mg total) by mouth daily. 90 tablet 3     traZODone (DESYREL) 50 MG tablet 1-2 tabs at hs. 60 tablet 11     No current facility-administered medications for this visit.      No Known Allergies  Social History     Tobacco Use     Smoking status: Never Smoker     Smokeless tobacco: Never Used   Substance Use Topics     Alcohol use: Yes     Comment: 1/month     Drug use: No       Review and/or order of clinical lab tests:  Review and/or order of radiology tests:  Review and/or order of medicine tests:  Discussion of test results with performing physician:  Decision to obtain old records and/or obtain history from someone other than the patient:  Review and summarization of old records and/or obtaining history from someone other than the patient and.or discussion of case with another health care provider:  Independent visualization of image, tracing or specimen itself:    Time:      Allan To MD

## 2021-07-03 NOTE — ADDENDUM NOTE
Addendum Note by Parmjit Melendez MLT at 1/29/2020  1:20 PM     Author: Parmjit Melendez MLT Service: -- Author Type:     Filed: 1/29/2020  5:06 PM Encounter Date: 1/29/2020 Status: Signed    : Parmjit Melendez MLT ()    Addended by: PARMJIT MELENDEZ on: 1/29/2020 05:06 PM        Modules accepted: Orders

## 2021-09-19 ENCOUNTER — HEALTH MAINTENANCE LETTER (OUTPATIENT)
Age: 51
End: 2021-09-19

## 2021-09-27 DIAGNOSIS — F33.42 RECURRENT MAJOR DEPRESSIVE DISORDER, IN FULL REMISSION (H): ICD-10-CM

## 2021-09-27 DIAGNOSIS — F32.A DEPRESSION: Primary | ICD-10-CM

## 2021-09-27 RX ORDER — CITALOPRAM HYDROBROMIDE 20 MG/1
20 TABLET ORAL DAILY PRN
COMMUNITY
End: 2021-09-27

## 2021-09-28 RX ORDER — CITALOPRAM HYDROBROMIDE 20 MG/1
20 TABLET ORAL DAILY PRN
Qty: 90 TABLET | Refills: 3 | Status: SHIPPED | OUTPATIENT
Start: 2021-09-28 | End: 2021-10-08

## 2021-09-28 NOTE — TELEPHONE ENCOUNTER
Reason for Call: patient is calling to restart medication     CELEXA 20 MG OR TABS     SEND TO [facility]    Detailed comments: Patient was taking this medication with good results, has appt scheduled for 10/8/21. Would like to get it filled prior to appointment if possible.  Last seen 05/2021    Phone Number Patient can be reached at: Home number on file [phone] (home)    Best Time: any    Can we leave a detailed message on this number? YES    Call taken on 9/27/2021 at 2:35 PM by Adele Leon    
Rx pended for renewal   
normal (ped)...

## 2021-10-04 ENCOUNTER — TELEPHONE (OUTPATIENT)
Dept: INTERNAL MEDICINE | Facility: CLINIC | Age: 51
End: 2021-10-04

## 2021-10-04 DIAGNOSIS — K21.9 GASTROESOPHAGEAL REFLUX DISEASE WITHOUT ESOPHAGITIS: ICD-10-CM

## 2021-10-04 DIAGNOSIS — Z13.220 SCREENING FOR HYPERLIPIDEMIA: ICD-10-CM

## 2021-10-04 DIAGNOSIS — Z13.1 SCREENING FOR DIABETES MELLITUS: ICD-10-CM

## 2021-10-04 DIAGNOSIS — F33.42 RECURRENT MAJOR DEPRESSIVE DISORDER, IN FULL REMISSION (H): Primary | ICD-10-CM

## 2021-10-04 NOTE — TELEPHONE ENCOUNTER
Patient called to ask if provider can put in orders for labs before her appt on 10/08/2021? She would like to come in on Wednesday to get labs done so that results are back in time for her appt on Friday. Please call patient to assist with scheduling appt once lab orders are placed.

## 2021-10-08 ENCOUNTER — OFFICE VISIT (OUTPATIENT)
Dept: INTERNAL MEDICINE | Facility: CLINIC | Age: 51
End: 2021-10-08
Payer: COMMERCIAL

## 2021-10-08 VITALS
WEIGHT: 193 LBS | BODY MASS INDEX: 27.02 KG/M2 | HEART RATE: 80 BPM | SYSTOLIC BLOOD PRESSURE: 124 MMHG | HEIGHT: 71 IN | DIASTOLIC BLOOD PRESSURE: 80 MMHG | OXYGEN SATURATION: 97 %

## 2021-10-08 DIAGNOSIS — Z13.1 SCREENING FOR DIABETES MELLITUS: ICD-10-CM

## 2021-10-08 DIAGNOSIS — K21.9 GASTROESOPHAGEAL REFLUX DISEASE WITHOUT ESOPHAGITIS: ICD-10-CM

## 2021-10-08 DIAGNOSIS — Z12.31 VISIT FOR SCREENING MAMMOGRAM: ICD-10-CM

## 2021-10-08 DIAGNOSIS — Z00.00 ANNUAL PHYSICAL EXAM: Primary | ICD-10-CM

## 2021-10-08 DIAGNOSIS — F33.42 RECURRENT MAJOR DEPRESSIVE DISORDER, IN FULL REMISSION (H): ICD-10-CM

## 2021-10-08 DIAGNOSIS — Z13.220 SCREENING FOR HYPERLIPIDEMIA: ICD-10-CM

## 2021-10-08 LAB
ALBUMIN SERPL-MCNC: 4.4 G/DL (ref 3.5–5)
ALBUMIN UR-MCNC: ABNORMAL MG/DL
ALP SERPL-CCNC: 46 U/L (ref 45–120)
ALT SERPL W P-5'-P-CCNC: 15 U/L (ref 0–45)
ANION GAP SERPL CALCULATED.3IONS-SCNC: 8 MMOL/L (ref 5–18)
APPEARANCE UR: CLEAR
AST SERPL W P-5'-P-CCNC: 19 U/L (ref 0–40)
BACTERIA #/AREA URNS HPF: ABNORMAL /HPF
BASOPHILS # BLD AUTO: 0 10E3/UL (ref 0–0.2)
BASOPHILS NFR BLD AUTO: 1 %
BILIRUB SERPL-MCNC: 0.8 MG/DL (ref 0–1)
BILIRUB UR QL STRIP: NEGATIVE
BUN SERPL-MCNC: 15 MG/DL (ref 8–22)
CALCIUM SERPL-MCNC: 9.6 MG/DL (ref 8.5–10.5)
CHLORIDE BLD-SCNC: 106 MMOL/L (ref 98–107)
CHOLEST SERPL-MCNC: 164 MG/DL
CO2 SERPL-SCNC: 26 MMOL/L (ref 22–31)
COLOR UR AUTO: YELLOW
CREAT SERPL-MCNC: 1.14 MG/DL (ref 0.6–1.1)
EOSINOPHIL # BLD AUTO: 0.1 10E3/UL (ref 0–0.7)
EOSINOPHIL NFR BLD AUTO: 1 %
ERYTHROCYTE [DISTWIDTH] IN BLOOD BY AUTOMATED COUNT: 12.4 % (ref 10–15)
FASTING STATUS PATIENT QL REPORTED: ABNORMAL
GFR SERPL CREATININE-BSD FRML MDRD: 56 ML/MIN/1.73M2
GLUCOSE BLD-MCNC: 84 MG/DL (ref 70–125)
GLUCOSE UR STRIP-MCNC: NEGATIVE MG/DL
HBA1C MFR BLD: 5 % (ref 0–5.6)
HCT VFR BLD AUTO: 45.3 % (ref 35–47)
HDLC SERPL-MCNC: 46 MG/DL
HGB BLD-MCNC: 15 G/DL (ref 11.7–15.7)
HGB UR QL STRIP: NEGATIVE
IMM GRANULOCYTES # BLD: 0 10E3/UL
IMM GRANULOCYTES NFR BLD: 0 %
KETONES UR STRIP-MCNC: NEGATIVE MG/DL
LDLC SERPL CALC-MCNC: 100 MG/DL
LEUKOCYTE ESTERASE UR QL STRIP: NEGATIVE
LYMPHOCYTES # BLD AUTO: 1.3 10E3/UL (ref 0.8–5.3)
LYMPHOCYTES NFR BLD AUTO: 22 %
MCH RBC QN AUTO: 29.8 PG (ref 26.5–33)
MCHC RBC AUTO-ENTMCNC: 33.1 G/DL (ref 31.5–36.5)
MCV RBC AUTO: 90 FL (ref 78–100)
MONOCYTES # BLD AUTO: 0.5 10E3/UL (ref 0–1.3)
MONOCYTES NFR BLD AUTO: 8 %
NEUTROPHILS # BLD AUTO: 4.1 10E3/UL (ref 1.6–8.3)
NEUTROPHILS NFR BLD AUTO: 68 %
NITRATE UR QL: NEGATIVE
PH UR STRIP: 7.5 [PH] (ref 5–8)
PLATELET # BLD AUTO: 323 10E3/UL (ref 150–450)
POTASSIUM BLD-SCNC: 4.9 MMOL/L (ref 3.5–5)
PROT SERPL-MCNC: 7.3 G/DL (ref 6–8)
RBC # BLD AUTO: 5.04 10E6/UL (ref 3.8–5.2)
RBC #/AREA URNS AUTO: ABNORMAL /HPF
SODIUM SERPL-SCNC: 140 MMOL/L (ref 136–145)
SP GR UR STRIP: 1.02 (ref 1–1.03)
SQUAMOUS #/AREA URNS AUTO: ABNORMAL /LPF
TRIGL SERPL-MCNC: 88 MG/DL
TSH SERPL DL<=0.005 MIU/L-ACNC: 1.19 UIU/ML (ref 0.3–5)
UROBILINOGEN UR STRIP-ACNC: 0.2 E.U./DL
WBC # BLD AUTO: 6 10E3/UL (ref 4–11)
WBC #/AREA URNS AUTO: ABNORMAL /HPF

## 2021-10-08 PROCEDURE — 36415 COLL VENOUS BLD VENIPUNCTURE: CPT | Performed by: INTERNAL MEDICINE

## 2021-10-08 PROCEDURE — 80050 GENERAL HEALTH PANEL: CPT | Performed by: INTERNAL MEDICINE

## 2021-10-08 PROCEDURE — 81001 URINALYSIS AUTO W/SCOPE: CPT | Performed by: INTERNAL MEDICINE

## 2021-10-08 PROCEDURE — 99396 PREV VISIT EST AGE 40-64: CPT | Mod: 25 | Performed by: INTERNAL MEDICINE

## 2021-10-08 PROCEDURE — 90471 IMMUNIZATION ADMIN: CPT | Performed by: INTERNAL MEDICINE

## 2021-10-08 PROCEDURE — 90472 IMMUNIZATION ADMIN EACH ADD: CPT | Performed by: INTERNAL MEDICINE

## 2021-10-08 PROCEDURE — 90682 RIV4 VACC RECOMBINANT DNA IM: CPT | Performed by: INTERNAL MEDICINE

## 2021-10-08 PROCEDURE — 90750 HZV VACC RECOMBINANT IM: CPT | Performed by: INTERNAL MEDICINE

## 2021-10-08 PROCEDURE — 83036 HEMOGLOBIN GLYCOSYLATED A1C: CPT | Performed by: INTERNAL MEDICINE

## 2021-10-08 PROCEDURE — 80061 LIPID PANEL: CPT | Performed by: INTERNAL MEDICINE

## 2021-10-08 PROCEDURE — 99214 OFFICE O/P EST MOD 30 MIN: CPT | Mod: 25 | Performed by: INTERNAL MEDICINE

## 2021-10-08 RX ORDER — CITALOPRAM HYDROBROMIDE 20 MG/1
20 TABLET ORAL DAILY
Qty: 90 TABLET | Refills: 3 | Status: SHIPPED | OUTPATIENT
Start: 2021-10-08 | End: 2022-08-03

## 2021-10-08 RX ORDER — CITALOPRAM HYDROBROMIDE 20 MG/1
20 TABLET ORAL DAILY PRN
Qty: 90 TABLET | Refills: 3 | Status: SHIPPED | OUTPATIENT
Start: 2021-10-08 | End: 2021-10-08

## 2021-10-08 ASSESSMENT — PATIENT HEALTH QUESTIONNAIRE - PHQ9: SUM OF ALL RESPONSES TO PHQ QUESTIONS 1-9: 10

## 2021-10-08 ASSESSMENT — MIFFLIN-ST. JEOR: SCORE: 1586.57

## 2021-10-08 NOTE — PROGRESS NOTES
Office Visit - Physical   Tamera Spence   50 year old  female    Date of visit: 10/8/2021  Physician: Allan To MD, MD     Assessment and Plan   1. Annual physical exam  This is a 50-year-old woman with issues as discussed below.  Ongoing healthy lifestyle discussed and recommended    2. Visit for screening mammogram  - *MA Screening Digital Bilateral; Future    3. Gastroesophageal reflux disease without esophagitis  - CBC with platelets; Future  - Comprehensive metabolic panel; Future  - UA reflex to Microscopic and Culture; Future  - CBC with Platelets & Differential  - Comprehensive metabolic panel  - UA reflex to Microscopic and Culture  - Urine Microscopic    4. Screening for hyperlipidemia  - Lipid panel reflex to direct LDL Fasting; Future  - Lipid panel reflex to direct LDL Fasting    5. Screening for diabetes mellitus  - Hemoglobin A1c; Future  - Hemoglobin A1c    6. Recurrent major depressive disorder, in full remission (H)  - TSH with free T4 reflex; Future  - citalopram (CELEXA) 20 MG tablet; Take 1 tablet (20 mg) by mouth daily  Dispense: 90 tablet; Refill: 3  - TSH with free T4 reflex        Return in about 1 year (around 10/8/2022) for Routine preventive.     Chief Complaint   Chief Complaint   Patient presents with     Physical     fasting; needs refills for Celexa         Patient Profile   Social History     Social History Narrative    . She has son (tranistioned from girl to boy), Anthony (2009), and daughter Mariya (2006). 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  She is originally from [place], [place], and  attended college at the [employer] and [employer].         Past Medical History   Patient Active Problem List   Diagnosis     Supervision of normal first pregnancy     Depression     Acid reflux     RESEARCH PATIENT     CARDIOVASCULAR SCREENING; LDL GOAL LESS THAN 160       Past Surgical  "History  She has a past surgical history that includes TRANSFER OF EMBRYO,INTRAUTERINE (2005,2008) and [place] Tooth Extraction.     History of Present Illness   This 50 year old old woman comes in for annual physical.  Overall doing well.  Some stress anxiety and would like to reconsider starting citalopram.    Review of Systems: A comprehensive review of systems was negative except as noted.     Medications and Allergies   Current Outpatient Medications   Medication Sig Dispense Refill     citalopram (CELEXA) 20 MG tablet Take 1 tablet (20 mg) by mouth daily 90 tablet 3     Allergies   Allergen Reactions     No Known Drug Allergies         Family and Social History   Family History   Problem Relation Age of Onset     Hypertension Mother      Depression Mother      Psychotic Disorder Mother         adhd     Hypertension Father      Musculoskeletal Disorder Father         multiple joint, back problems     Attention Deficit Disorder Sister      Psychotic Disorder Sister      Depression Sister      Attention Deficit Disorder Sister      No Known Problems Daughter      No Known Problems Son         Social History     Tobacco Use     Smoking status: Never Smoker     Smokeless tobacco: Never Used   Substance Use Topics     Alcohol use: Yes     Comment: Alcoholic Drinks/day: 1/month     Drug use: No        Physical Exam   General Appearance:   No acute distress    /80 (BP Location: Left arm, Patient Position: Sitting, Cuff Size: Adult Small)   Pulse 80   Ht 1.803 m (5' 11\")   Wt 87.5 kg (193 lb)   SpO2 97%   BMI 26.92 kg/m      EYES: Eyelids, conjunctiva, and sclera were normal. Pupils were normal. Cornea, iris, and lens were normal bilaterally.  HEAD, EARS, NOSE, MOUTH, AND THROAT: Head and face were normal. Hearing was normal to voice and the ears were normal to external exam. Nose appearance was normal and there was no discharge. Oropharynx was normal.  NECK: Neck appearance was normal. There were no neck " masses and the thyroid was not enlarged.  RESPIRATORY: Breathing pattern was normal and the chest moved symmetrically.  Percussion/auscultatory percussion was normal.  Lung sounds were normal and there were no abnormal sounds.  CARDIOVASCULAR: Heart rate and rhythm were normal.  S1 and S2 were normal and there were no extra sounds or murmurs. Peripheral pulses in arms and legs were normal.  Jugular venous pressure was normal.  There was no peripheral edema.  GASTROINTESTINAL: The abdomen was normal in contour.  Bowel sounds were present.  Percussion detected no organ enlargement or tenderness.  Palpation detected no tenderness, mass, or enlarged organs.   MUSCULOSKELETAL: Skeletal configuration was normal and muscle mass was normal for age. Joint appearance was overall normal.  LYMPHATIC: There were no enlarged nodes.  SKIN/HAIR/NAILS: Skin color was normal.  There were no skin lesions.  Hair and nails were normal.  NEUROLOGIC: The patient was alert and oriented to person, place, time, and circumstance. Speech was normal. Cranial nerves were normal. Motor strength was normal for age. The patient was normally coordinated.  PSYCHIATRIC:  Mood and affect were normal and the patient had normal recent and remote memory. The patient's judgment and insight were normal.       Additional Information        Allan To MD, MD  Internal Medicine  Contact me at [phone]  Answers for HPI/ROS submitted by the patient on 10/8/2021  Frequency of exercise:: 1 day/week  Getting at least 3 servings of Calcium per day:: NO  Diet:: Regular (no restrictions)  Taking medications regularly:: Yes  Medication side effects:: Not applicable  Bi-annual eye exam:: NO  Dental care twice a year:: Yes  Sleep apnea or symptoms of sleep apnea:: Excessive snoring  Additional concerns today:: No  Duration of exercise:: Less than 15 minutes

## 2022-02-04 ENCOUNTER — THERAPY VISIT (OUTPATIENT)
Dept: PHYSICAL THERAPY | Facility: CLINIC | Age: 52
End: 2022-02-04
Payer: COMMERCIAL

## 2022-02-04 DIAGNOSIS — G89.29 CHRONIC PAIN OF RIGHT KNEE: ICD-10-CM

## 2022-02-04 DIAGNOSIS — M25.561 CHRONIC PAIN OF RIGHT KNEE: ICD-10-CM

## 2022-02-04 PROCEDURE — 97140 MANUAL THERAPY 1/> REGIONS: CPT | Mod: GP | Performed by: PHYSICAL THERAPIST

## 2022-02-04 PROCEDURE — 97161 PT EVAL LOW COMPLEX 20 MIN: CPT | Mod: GP | Performed by: PHYSICAL THERAPIST

## 2022-02-04 PROCEDURE — 97530 THERAPEUTIC ACTIVITIES: CPT | Mod: GP | Performed by: PHYSICAL THERAPIST

## 2022-02-04 ASSESSMENT — ACTIVITIES OF DAILY LIVING (ADL)
AS_A_RESULT_OF_YOUR_KNEE_INJURY,_HOW_WOULD_YOU_RATE_YOUR_CURRENT_LEVEL_OF_DAILY_ACTIVITY?: ABNORMAL
GO DOWN STAIRS: ACTIVITY IS MINIMALLY DIFFICULT
GO UP STAIRS: ACTIVITY IS MINIMALLY DIFFICULT
RAW_SCORE: 59
KNEE_ACTIVITY_OF_DAILY_LIVING_SCORE: 84.29
KNEEL ON THE FRONT OF YOUR KNEE: ACTIVITY IS MINIMALLY DIFFICULT
HOW_WOULD_YOU_RATE_THE_OVERALL_FUNCTION_OF_YOUR_KNEE_DURING_YOUR_USUAL_DAILY_ACTIVITIES?: ABNORMAL
HOW_WOULD_YOU_RATE_THE_CURRENT_FUNCTION_OF_YOUR_KNEE_DURING_YOUR_USUAL_DAILY_ACTIVITIES_ON_A_SCALE_FROM_0_TO_100_WITH_100_BEING_YOUR_LEVEL_OF_KNEE_FUNCTION_PRIOR_TO_YOUR_INJURY_AND_0_BEING_THE_INABILITY_TO_PERFORM_ANY_OF_YOUR_USUAL_DAILY_ACTIVITIES?: 80
STAND: ACTIVITY IS NOT DIFFICULT
WEAKNESS: I DO NOT HAVE THE SYMPTOM
LIMPING: THE SYMPTOM AFFECTS MY ACTIVITY SLIGHTLY
RISE FROM A CHAIR: ACTIVITY IS MINIMALLY DIFFICULT
KNEE_ACTIVITY_OF_DAILY_LIVING_SUM: 59
SWELLING: I DO NOT HAVE THE SYMPTOM
PAIN: THE SYMPTOM AFFECTS MY ACTIVITY MODERATELY
WALK: ACTIVITY IS MINIMALLY DIFFICULT
GIVING WAY, BUCKLING OR SHIFTING OF KNEE: I DO NOT HAVE THE SYMPTOM
STIFFNESS: I DO NOT HAVE THE SYMPTOM
SQUAT: ACTIVITY IS MINIMALLY DIFFICULT
SIT WITH YOUR KNEE BENT: ACTIVITY IS NOT DIFFICULT

## 2022-02-04 NOTE — PROGRESS NOTES
"Physical Therapy Initial Evaluation  Subjective:  The history is provided by the patient. No  was used.   Therapist Generated HPI Evaluation  Problem details: Nov. 2021. Pt started having R knee pain in Nov. 2021. She bought new compact car at that time which she thinks contributed to her pain. She has to hold her leg up and position in new way in her new car. Pt is 5' 11\" tall..         Type of problem:  Right knee.    This is a new condition.  Condition occurred with:  Repetition/overuse.  Where condition occurred: in the community.  Patient reports pain:  Medial.  Pain is described as stabbing and aching and is intermittent.  Pain is worse during the day.  Since onset symptoms are unchanged.  Symptoms are exacerbated by other (driving, Yoga poses with knee bent and twisting)  and relieved by ice.      Restrictions due to condition include:  Working in normal job without restrictions.  Barriers include:  None as reported by patient.    Patient Health History  Tamera Spence being seen for knee pain.          Pain is reported as 4/10 on pain scale.  General health as reported by patient is good.  Pertinent medical history includes: depression.   Red flags:  None as reported by patient.  Medical allergies: none.   Surgeries include:  None.    Current medications:  Anti-depressants.    Current occupation is Researcher.   Primary job tasks include:  Computer work.                                    Objective:    Gait:    Assistive Devices:  None  Deviations:  Knee:  Poor patellar tracking R and poor patellar tracking L                                                      Knee Evaluation:  ROM:    AROM        Flexion: Left: WNL -    Right: WNL +  PROM    Hyperextension: Left: WNL -    Right:  WNL -          Strength:         Quad Set Left:  WNL    Pain: -   Quad Set Right:  WNL    Pain: -  Ligament Testing:  Normal                Special Tests:     Left knee negative for the following special " tests:  Meninscal and IT Band Friction  Right knee positive for the following tests:  Meniscal and IT Band Friction  Palpation:      Right knee tenderness present at:  Medial Joint Line; IT Band and Patellar Lateral  Right knee tenderness not present at:  Lateral Joint Line; Patellar Tendon; Popliteal; Patellar Medial; Patellar Superior and Patellar Inferior  Edema:  Normal      Functional Testing:          Quad:    Single Leg Squat:  Left:       Right:        Bilateral Leg Squat:  Mild R knee pain  Mild loss of control and excessive anterior knee excursion    Retro Step Up: Left:  Mild loss of control, no pain    Right: mild loss of control, no pain    % of Uninvolved:           General     ROS    Assessment/Plan:    Patient is a 51 year old female with right side knee complaints.    Patient has the following significant findings with corresponding treatment plan.                Diagnosis 1:  R knee ITB friction syndrome  Pain -  hot/cold therapy, manual therapy, splint/taping/bracing/orthotics, self management, education and home program  Decreased proprioception - neuro re-education, therapeutic activities and home program  Impaired muscle performance - neuro re-education, home program and therapeutic exercise  Decreased function - therapeutic activities and home program  Impaired posture - neuro re-education and home program    Therapy Evaluation Codes:   1) History comprised of:   Personal factors that impact the plan of care:      None.    Comorbidity factors that impact the plan of care are:      Depression.     Medications impacting care: Anti-depressant.  2) Examination of Body Systems comprised of:   Body structures and functions that impact the plan of care:      Knee.   Activity limitations that impact the plan of care are:      Driving, Squatting/kneeling, Stairs and Walking.  3) Clinical presentation characteristics are:   Stable/Uncomplicated.  4) Decision-Making    Low complexity using standardized  patient assessment instrument and/or measureable assessment of functional outcome.  Cumulative Therapy Evaluation is: Low complexity.    Previous and current functional limitations:  (See Goal Flow Sheet for this information)    Short term and Long term goals: (See Goal Flow Sheet for this information)     Communication ability:  Patient appears to be able to clearly communicate and understand verbal and written communication and follow directions correctly.  Treatment Explanation - The following has been discussed with the patient:   RX ordered/plan of care  Anticipated outcomes  Possible risks and side effects  This patient would benefit from PT intervention to resume normal activities.   Rehab potential is good.    Frequency:  1 X week, once daily  Duration:  for 4 weeks tapering to 1 X every other week over 4 weeks  Discharge Plan:  Achieve all LTG.  Independent in home treatment program.  Reach maximal therapeutic benefit.    Please refer to the daily flowsheet for treatment today, total treatment time and time spent performing 1:1 timed codes.

## 2022-03-07 ENCOUNTER — VIRTUAL VISIT (OUTPATIENT)
Dept: PHYSICAL THERAPY | Facility: CLINIC | Age: 52
End: 2022-03-07
Payer: COMMERCIAL

## 2022-03-07 DIAGNOSIS — G89.29 CHRONIC PAIN OF RIGHT KNEE: ICD-10-CM

## 2022-03-07 DIAGNOSIS — M25.561 CHRONIC PAIN OF RIGHT KNEE: ICD-10-CM

## 2022-03-07 PROCEDURE — 97110 THERAPEUTIC EXERCISES: CPT | Mod: GP | Performed by: PHYSICAL THERAPIST

## 2022-03-22 ENCOUNTER — ALLIED HEALTH/NURSE VISIT (OUTPATIENT)
Dept: FAMILY MEDICINE | Facility: CLINIC | Age: 52
End: 2022-03-22
Payer: COMMERCIAL

## 2022-03-22 DIAGNOSIS — Z23 VACCINE FOR VZV (VARICELLA-ZOSTER VIRUS): Primary | ICD-10-CM

## 2022-03-22 PROCEDURE — 90750 HZV VACC RECOMBINANT IM: CPT

## 2022-03-22 PROCEDURE — 90471 IMMUNIZATION ADMIN: CPT

## 2022-03-22 PROCEDURE — 99207 PR NO CHARGE NURSE ONLY: CPT

## 2022-03-25 ENCOUNTER — VIRTUAL VISIT (OUTPATIENT)
Dept: PHYSICAL THERAPY | Facility: CLINIC | Age: 52
End: 2022-03-25
Payer: COMMERCIAL

## 2022-03-25 DIAGNOSIS — G89.29 CHRONIC PAIN OF RIGHT KNEE: ICD-10-CM

## 2022-03-25 DIAGNOSIS — M25.561 CHRONIC PAIN OF RIGHT KNEE: ICD-10-CM

## 2022-03-25 PROCEDURE — 97110 THERAPEUTIC EXERCISES: CPT | Mod: GP | Performed by: PHYSICAL THERAPIST

## 2022-03-27 PROBLEM — M25.561 CHRONIC PAIN OF RIGHT KNEE: Status: RESOLVED | Noted: 2022-02-04 | Resolved: 2022-03-27

## 2022-03-27 PROBLEM — G89.29 CHRONIC PAIN OF RIGHT KNEE: Status: RESOLVED | Noted: 2022-02-04 | Resolved: 2022-03-27

## 2022-03-27 NOTE — PROGRESS NOTES
Discharge Note    Patient seen for 3 visits.    SUBJECTIVE  Subjective changes noted by patient:  R knee feels worse after performing elliptical  and Yoga at gym. Locates pain over medial R knee. Pt plans to continue on her own with her HEP at this time.  Current pain level is 1/10.     Previous pain level was  4/10.   Changes in function:  Yes (See Goal flowsheet attached for changes in current functional level)  Adverse reaction to treatment or activity: None    OBJECTIVE  Changes noted in objective findings: TTP over medial R knee per pt.     ASSESSMENT/PLAN  Diagnosis: R ITB friction syndrome   Updated problem list and treatment plan:   Pain - HEP  Impaired muscle performance - HEP  Decreased proprioception - HEP  Impaired posture - HEP  STG/LTGs have been met or progress has been made towards goals:  Yes, please see goal flowsheet for most current information  Assessment of Progress: current status is unknown.    Last current status:     Self Management Plans:  HEP  I have re-evaluated this patient and find that the nature, scope, duration and intensity of the therapy is appropriate for the medical condition of the patient.  Tamera continues to require the following intervention to meet STG and LTG's:  HEP.    Recommendations:  Discharge with current home program.  Patient to follow up with MD as needed.    Please refer to the daily flowsheet for treatment today, total treatment time and time spent performing 1:1 timed codes.

## 2022-04-29 ENCOUNTER — MYC MEDICAL ADVICE (OUTPATIENT)
Dept: INTERNAL MEDICINE | Facility: CLINIC | Age: 52
End: 2022-04-29
Payer: COMMERCIAL

## 2022-05-27 ENCOUNTER — ANCILLARY PROCEDURE (OUTPATIENT)
Dept: MAMMOGRAPHY | Facility: CLINIC | Age: 52
End: 2022-05-27
Attending: INTERNAL MEDICINE
Payer: COMMERCIAL

## 2022-05-27 DIAGNOSIS — Z12.31 VISIT FOR SCREENING MAMMOGRAM: ICD-10-CM

## 2022-05-27 PROCEDURE — 77067 SCR MAMMO BI INCL CAD: CPT | Mod: TC | Performed by: RADIOLOGY

## 2022-07-20 ENCOUNTER — NURSE TRIAGE (OUTPATIENT)
Dept: NURSING | Facility: CLINIC | Age: 52
End: 2022-07-20

## 2022-07-20 NOTE — TELEPHONE ENCOUNTER
Patient calling, and states she thinks she was bitten by a [place] Tick , while up north at [place].  She is asking what to do, and was advised to be seen  By provider , who may put her on prophylactic antibiotics.    Patient states she will be back in town on Saturday, and was advised we have UC at [facility] and [facility] clinics.    Patient states she will go in on Saturday.    Lurdes Sawyer RN   [facility] Nurse Advisor    Reason for Disposition    Patient wants to be seen    Additional Information    Negative: Red ring or bull's-eye rash occurs around a deer tick bite    Negative: Probable deer tick that was attached > 36 hours (or tick appears swollen, not flat)    Negative: Fever or severe headache occurs, 2 to 14 days following the bite    Negative: Widespread rash occurs, 2 to 14 days following the bite    Negative: Can't remove live tick (after using Care Advice)    Negative: Can't remove tick's head that was broken off in the skin (after using Care Advice)    Negative: Fever and area is red    Negative: Fever and area is very tender to touch    Negative: Red streak or red line and length > 2 inches (5 cm)    Negative: Patient sounds very sick or weak to the triager    Negative: Not a tick bite    Protocols used: TICK BITE-A-OH

## 2022-07-23 ENCOUNTER — OFFICE VISIT (OUTPATIENT)
Dept: URGENT CARE | Facility: URGENT CARE | Age: 52
End: 2022-07-23
Payer: COMMERCIAL

## 2022-07-23 VITALS
SYSTOLIC BLOOD PRESSURE: 130 MMHG | RESPIRATION RATE: 12 BRPM | HEART RATE: 63 BPM | DIASTOLIC BLOOD PRESSURE: 85 MMHG | TEMPERATURE: 98.7 F | OXYGEN SATURATION: 98 %

## 2022-07-23 DIAGNOSIS — S30.861A TICK BITE OF ABDOMINAL WALL, INITIAL ENCOUNTER: Primary | ICD-10-CM
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
## 2022-07-23 PROCEDURE — 99203 OFFICE O/P NEW LOW 30 MIN: CPT | Performed by: PHYSICIAN ASSISTANT

## 2022-07-23 NOTE — PROGRESS NOTES
Chief Complaint   Patient presents with     Urgent Care     Insect Bites     Left abdominal insect bite, embedded, patient noticed insect embedded in skin 7/20       ASSESSMENT/PLAN:  Tamera was seen today for urgent care and insect bites.    Diagnoses and all orders for this visit:    Tick bite of abdominal wall, initial encounter    Patient does provide a deer tick she found on her that was not engorged.  Tick would have been at most attached for 12 hours but likely last.  She is 76 hours status post removal.  We had a long discussion about risk of Lyme transmission and prophylactic antibiotics.  Depending on the resources you look at she would not qualify for prophylactic antibiotics since she is past 72 hours and because she has not had a tick attached to her greater than 24 to 36 hours.  It still seems reasonable to treat with a one-time dose of doxycycline but patient defers at this time.  Also offered Lyme testing but she defers given the low risk which I think is reasonable.  Did discuss signs and symptoms of early Lyme disease which would warrant repeat visit to clinic for evaluation    Reji Thornton PA-C      SUBJECTIVE:  Tamera is a 51 year old female who presents to urgent care with a tick bite.  She was up [place] and just returned to the [facility].  3 days ago at approximately 11 AM she noticed a tick on the left abdomen.  She had done a tick check the night before at about 11 PM while showering and there was no tick in that location.  The tick was removed 76 hours ago.  She feels otherwise well currently.  She brought the tick in and it is a deer tick    ROS: Pertinent ROS neg other than the symptoms noted above in the HPI.     OBJECTIVE:  /85   Pulse 63   Temp 98.7  F (37.1  C) (Oral)   Resp 12   SpO2 98%    GENERAL: healthy, alert and no distress  SKIN:  bite bailee in the left lower abdomen with some minimal surrounding erythema.  No retained foreign body    DIAGNOSTICS    No results found  for any visits on 07/23/22.     Current Outpatient Medications   Medication     citalopram (CELEXA) 20 MG tablet     No current facility-administered medications for this visit.      Patient Active Problem List   Diagnosis     Supervision of normal first pregnancy     Depression     Acid reflux     RESEARCH PATIENT     CARDIOVASCULAR SCREENING; LDL GOAL LESS THAN 160      Past Medical History:   Diagnosis Date     Acid reflux      Depression 2001     Depression      Fainting      Past Surgical History:   Procedure Laterality Date     WISDOM TOOTH EXTRACTION       ZZC TRANSFER OF EMBRYO,INTRAUTERINE  2005,2008     Family History   Problem Relation Age of Onset     Hypertension Mother      Depression Mother      Psychotic Disorder Mother         adhd     Hypertension Father      Musculoskeletal Disorder Father         multiple joint, back problems     Attention Deficit Disorder Sister      Psychotic Disorder Sister      Depression Sister      Attention Deficit Disorder Sister      No Known Problems Daughter      No Known Problems Son      Social History     Tobacco Use     Smoking status: Never Smoker     Smokeless tobacco: Never Used   Substance Use Topics     Alcohol use: Yes     Comment: Alcoholic Drinks/day: 1/month              The plan of care was discussed with the patient. They understand and agree with the course of treatment prescribed. A printed summary was given including instructions and medications.  The use of Dragon/[employer] dictation services may have been used to construct the content in this note; any grammatical or spelling errors are non-intentional. Please contact the author of this note directly if you are in need of any clarification.

## 2022-08-03 ENCOUNTER — VIRTUAL VISIT (OUTPATIENT)
Dept: INTERNAL MEDICINE | Facility: CLINIC | Age: 52
End: 2022-08-03
Payer: COMMERCIAL

## 2022-08-03 DIAGNOSIS — R29.818 SUSPECTED SLEEP APNEA: ICD-10-CM

## 2022-08-03 DIAGNOSIS — A69.20 ERYTHEMA MIGRANS (LYME DISEASE): Primary | ICD-10-CM

## 2022-08-03 DIAGNOSIS — R06.83 SNORING: ICD-10-CM

## 2022-08-03 DIAGNOSIS — G47.19 EXCESSIVE DAYTIME SLEEPINESS: ICD-10-CM

## 2022-08-03 PROCEDURE — 99214 OFFICE O/P EST MOD 30 MIN: CPT | Mod: GT | Performed by: INTERNAL MEDICINE

## 2022-08-03 RX ORDER — DOXYCYCLINE 100 MG/1
100 CAPSULE ORAL 2 TIMES DAILY
Qty: 20 CAPSULE | Refills: 0 | Status: SHIPPED | OUTPATIENT
Start: 2022-08-03 | End: 2022-08-13

## 2022-08-03 ASSESSMENT — PATIENT HEALTH QUESTIONNAIRE - PHQ9
10. IF YOU CHECKED OFF ANY PROBLEMS, HOW DIFFICULT HAVE THESE PROBLEMS MADE IT FOR YOU TO DO YOUR WORK, TAKE CARE OF THINGS AT HOME, OR GET ALONG WITH OTHER PEOPLE: SOMEWHAT DIFFICULT
SUM OF ALL RESPONSES TO PHQ QUESTIONS 1-9: 6
SUM OF ALL RESPONSES TO PHQ QUESTIONS 1-9: 6

## 2022-08-03 NOTE — PROGRESS NOTES
"Tamera is a 51 year old who is being evaluated via a billable video visit.      How would you like to obtain your AVS? [email or web address]  If the video visit is dropped, the invitation should be resent by: Text to cell phone: [phone]  Will anyone else be joining your video visit? No      (PT WILL GET IN THROUGH [employer] Media[employer] AROUND 9:05 AM)    Assessment & Plan     1. Erythema migrans (Lyme disease)  Given the presence of ongoing rash 2 weeks after a deer tick bite, I would recommend treating with doxycycline  - doxycycline hyclate (VIBRAMYCIN) 100 MG capsule; Take 1 capsule (100 mg) by mouth 2 times daily for 10 days  Dispense: 20 capsule; Refill: 0    2. Suspected sleep apnea  - Adult Sleep Eval & Management  Referral; Future    3. Excessive daytime sleepiness  - Adult Sleep Eval & Management  Referral; Future    4. Snoring  - Adult Sleep Eval & Management  Referral; Future     BMI:   Estimated body mass index is 26.92 kg/m  as calculated from the following:    Height as of 10/8/21: 1.803 m (5' 11\").    Weight as of 10/8/21: 87.5 kg (193 lb).           Return in about 2 weeks (around 8/17/2022) for video visit, if symptoms worsen/fail to improve.    Allan To MD  [facility]   Tamera is a 51 year old, presenting for the following health issues: This 51-year-old woman had a deer tick bite on her abdomen about 2 weeks ago.  Since that time she has had an area of redness around the bite.  She otherwise is feeling okay.  The tick was embedded.  She thinks it was attached for less than 12 hours but she is not entirely certain.  She went to urgent care shortly after the bite and they deferred antibiotics at that time.  She is also concerned about sleep apnea she has been tired during the day and she snores.  Tick Bite (2 weeks ago (Rash))    HPI     Review of Systems       Objective         Vitals:  No vitals were obtained today due to virtual " visit.    Physical Exam           Video-Visit Details    Video Start Time: 9:13 AM    Type of service:  Video Visit    Video End Time:9:23 AM    Originating Location (pt. Location): Home    Distant Location (provider location):  [facility] [place]     Platform used for Video Visit: Kal Liang

## 2022-09-05 ENCOUNTER — NURSE TRIAGE (OUTPATIENT)
Dept: NURSING | Facility: CLINIC | Age: 52
End: 2022-09-05

## 2022-09-05 DIAGNOSIS — N93.9 VAGINAL SPOTTING: Primary | ICD-10-CM

## 2022-09-05 NOTE — TELEPHONE ENCOUNTER
Clinic Action Needed:Yes, please call Tamera back at [phone] (H). Ok to leave a detailed VM if no answer.     Reason for Call: IUD placement. Vaginal bleeding post intercourse. See nurse triage details below.     Patient Recommendations: Referred to clinic - within 72 hours.     Routed to: Dr. To/ Care Team    Lorraine Ruffin, RN-BSN  [facility]

## 2022-09-05 NOTE — TELEPHONE ENCOUNTER
"\"I have a little bit of vaginal bleeding which I haven't had since I had an IUD placed about 4 years. The last time I went in they couldn't find the string.\" Tamera noted bright red blood on her panties last night. This occurred after intercourse and did not soak through any pads or tampons. Patient denies feeling faint/ dizzy/ lightheaded, abdominal pain, fever, or pregnancy symptoms. Otherwise, Tamera is awake, alert, and responding appropriately.     Triage guidelines recommend to see a provider within 3 days. Tamera inquired as to why she would be having bleeding. RN advised that it could be due to the IUD, but her provider would have to advise further in case further imaging (i.e. ultrasound) might be needed. RN advised that will send message to provider/ care team to follow up with patient and to contact her clinic or call back with any changes, worsening of symptoms, and questions or concerns. Patient verbalized understanding of and agreement with plan and had no further questions.     Reason for Disposition    Cannot feel IUD string or worried that IUD is not in right place (e.g., string longer than usual, can feel hard plastic of IUD)    Additional Information    Negative: Shock suspected (e.g., cold/pale/clammy skin, too weak to stand, low BP, rapid pulse)    Negative: Sounds like a life-threatening emergency to the triager    Negative: SEVERE vaginal bleeding (e.g., soaking 2 pads or tampons per hour and present 2 or more hours; 1 menstrual cup every 2 hours)    Negative: SEVERE dizziness (e.g., unable to stand, requires support to walk, feels like passing out now)    Negative: Patient sounds very sick or weak to the triager    Negative: MODERATE vaginal bleeding (e.g., soaking 1 pad or tampon per hour and present > 6 hours; 1 menstrual cup every 6 hours)    Negative: [1] Vaginal bleeding is WORSE than normal (e.g., heavier) AND [2] dizziness or lightheadedness    Negative: [1] Constant abdominal pain " AND [2] present > 2 hours    Negative: [1] Caller has URGENT question AND [2] triager unable to answer question    Negative: [1] MILD abdominal pain (e.g., does not interfere with normal activities) AND [2] pain comes and goes (cramps) AND [3] present > 48 hours    Negative: [1] Caller has NON-URGENT question AND [2] triager unable to answer question    Negative: Pregnant    Negative: [1] Vaginal bleeding with > 6 soaked pads or tampons per day AND [2] last > 7 days    Protocols used: CONTRACEPTION - IUD SYMPTOMS AND [id number]    Lorraine Ruffin, RN-BSN  [facility]

## 2022-09-06 NOTE — TELEPHONE ENCOUNTER
It makes sense to see gyn . There may be other causes for bleeding . I dont see any indication for an urgent visit .

## 2022-09-06 NOTE — TELEPHONE ENCOUNTER
Spoke with pt and gave her the information from Dr. Delgadillo. Suggested that pt check with insurance to see who is in network for gyn.     Pt is requesting another US pelvic for checking of placement. She is having some abdominal pressure and is concerned that the IUD may be out of place. She is going to look for a gyn to see however is requesting that the US be done prior to then if possible.

## 2022-10-26 ENCOUNTER — VIRTUAL VISIT (OUTPATIENT)
Dept: SLEEP MEDICINE | Facility: CLINIC | Age: 52
End: 2022-10-26
Attending: INTERNAL MEDICINE
Payer: COMMERCIAL

## 2022-10-26 VITALS — BODY MASS INDEX: 28 KG/M2 | HEIGHT: 71 IN | WEIGHT: 200 LBS

## 2022-10-26 DIAGNOSIS — G47.19 EXCESSIVE DAYTIME SLEEPINESS: ICD-10-CM

## 2022-10-26 DIAGNOSIS — R29.818 SUSPECTED SLEEP APNEA: Primary | ICD-10-CM

## 2022-10-26 DIAGNOSIS — G47.63 SLEEP RELATED BRUXISM: ICD-10-CM

## 2022-10-26 DIAGNOSIS — R06.83 SNORING: ICD-10-CM

## 2022-10-26 PROCEDURE — 99204 OFFICE O/P NEW MOD 45 MIN: CPT | Mod: GT | Performed by: INTERNAL MEDICINE

## 2022-10-26 ASSESSMENT — SLEEP AND FATIGUE QUESTIONNAIRES
HOW LIKELY ARE YOU TO NOD OFF OR FALL ASLEEP WHEN YOU ARE A PASSENGER IN A CAR FOR AN HOUR WITHOUT A BREAK: MODERATE CHANCE OF DOZING
HOW LIKELY ARE YOU TO NOD OFF OR FALL ASLEEP WHILE SITTING AND TALKING TO SOMEONE: WOULD NEVER DOZE
HOW LIKELY ARE YOU TO NOD OFF OR FALL ASLEEP IN A CAR, WHILE STOPPED FOR A FEW MINUTES IN TRAFFIC: WOULD NEVER DOZE
HOW LIKELY ARE YOU TO NOD OFF OR FALL ASLEEP WHILE WATCHING TV: MODERATE CHANCE OF DOZING
HOW LIKELY ARE YOU TO NOD OFF OR FALL ASLEEP WHILE SITTING AND READING: SLIGHT CHANCE OF DOZING
HOW LIKELY ARE YOU TO NOD OFF OR FALL ASLEEP WHILE LYING DOWN TO REST IN THE AFTERNOON WHEN CIRCUMSTANCES PERMIT: HIGH CHANCE OF DOZING
HOW LIKELY ARE YOU TO NOD OFF OR FALL ASLEEP WHILE SITTING QUIETLY AFTER LUNCH WITHOUT ALCOHOL: WOULD NEVER DOZE
HOW LIKELY ARE YOU TO NOD OFF OR FALL ASLEEP WHILE SITTING INACTIVE IN A PUBLIC PLACE: WOULD NEVER DOZE

## 2022-10-26 NOTE — PROGRESS NOTES
Tamera is a 52 year old who is being evaluated via a billable video visit.      How would you like to obtain your AVS? MyChart  If the video visit is dropped, the invitation should be resent by: Send to e-mail at: [email or web address]  Will anyone else be joining your video visit? [place]  Louisa Little    Video-Visit Details    Video Start Time: 8:31 AM    Type of service:  Video Visit    Video End Time:8:47 AM    Originating Location (pt. Location): Home    Distant Location (provider location):  Off-site    Platform used for Video Visit: [employer]     Additional 15 minutes on the date of service was spent performing the following:    -Preparing to see the patient  -Obtaining and/or reviewing separately obtained history   -Ordering medications, tests, or procedures   -Documenting clinical information in the electronic or other health record     Thank you for the opportunity to participate in the care of  Tamera Spence.    Assessment and Plan:    In summary Tamera Spence is a 52 year old year old female here for sleep disturbance.  1. Suspected sleep apnea/Hypersomnia/Snoring/Sleep related Bruxism   Tamera Spence has high risk for obstructive sleep apnea based on the history of hypersomnia, snoring and a crowded airway. I educated the patient on the underlying pathophysiology of obstructive sleep apnea. We reviewed the risks associated with sleep apnea, including increased cardiovascular risk and overall death. We talked about treatments briefly. I recommend getting a Home sleep study. The patient should return to the clinic to discuss results and treatment option in a patient-centered approach.    The patient has given me permission to put in an order for CPAP if her sleep study is [facility]    Lab reviewed: Discussed with patient.    History of present illness:    She is a 52 year old female who comes to the virtual clinic with a chief complaint of excessive daytime  sleepiness that is been going on for more than a year.  While the patient denies any episodes of witnessed apnea she has been told that she does have loud snoring during sleep.  Her snoring is so loud that he can be heard throughout the house with the door closed.  The patient also has been told that she does grind her teeth during sleep.     Ideal Sleep-Wake Cycle(devoid of societal pressure):    Patient would try to initiate sleep at around 9 PM with a sleep latency of less than 20 minutes. The patient would have 1 awakenings. Final wake up time is around 9 AM.    LISA:  LISA Total Score: 15  Total score - [place]: 8 (10/26/2022  8:09 AM)    Patient told to return in one week after the sleep study is interpreted.    Patient Active Problem List   Diagnosis     Supervision of normal first pregnancy     Depression     Acid reflux     RESEARCH PATIENT     CARDIOVASCULAR SCREENING; LDL GOAL LESS THAN 160     Past Medical History:   Diagnosis Date     Acid reflux      Depression 2001     Depression      Fainting      Past Surgical History:   Procedure Laterality Date     WISDOM TOOTH EXTRACTION       ZZC TRANSFER OF EMBRYO,INTRAUTERINE  2005,2008     No current outpatient medications on file.     No known drug allergies  Social History     Socioeconomic History     Marital status:      Spouse name: Not on file     Number of children: Not on file     Years of education: Not on file     Highest education level: Not on file   Occupational History     Not on file   Tobacco Use     Smoking status: Never     Smokeless tobacco: Never   Substance and Sexual Activity     Alcohol use: Yes     Comment: Alcoholic Drinks/day: 1/month     Drug use: No     Sexual activity: Yes     Partners: Male   Other Topics Concern     Not on file   Social History Narrative    . She has son (tranistioned from girl to boy), Anthony (2009), and daughter Mariya (2006). 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  She is originally from [place], [place], and  attended college at the [employer] and [employer].      Social Determinants of Health     Financial Resource Strain: Not on file   Food Insecurity: Not on file   Transportation Needs: Not on file   Physical Activity: Not on file   Stress: Not on file   Social Connections: Not on file   Intimate Partner Violence: Not on file   Housing Stability: Not on file     Family History   Problem Relation Age of Onset     Hypertension Mother      Depression Mother      Psychotic Disorder Mother         adhd     Hypertension Father      Musculoskeletal Disorder Father         multiple joint, back problems     Attention Deficit Disorder Sister      Psychotic Disorder Sister      Depression Sister      Attention Deficit Disorder Sister      No Known Problems Daughter      No Known Problems Son         Physical Exam:  GEN: NAD,   Head: Normocephalic.  EYES: EOMI  ENT: Oropharynx is clear, Emery class 4+ airway.   Psych: normal mood, normal affect  Snore test:(+)     Labs/Studies:     No results found for: PH, PHARTERIAL, PO2, [id number], SAT, PCO2, HCO3, BASEEXCESS, LUCY, BEB  Lab Results   Component Value Date    TSH 1.19 10/08/2021     Lab Results   Component Value Date    GLC 84 10/08/2021     Lab Results   Component Value Date    HGB 15.0 10/08/2021    HGB 10.7 (L) 11/22/2009     Lab Results   Component Value Date    BUN 15 10/08/2021    CR 1.14 (H) 10/08/2021     Lab Results   Component Value Date    AST 19 10/08/2021    ALT 15 10/08/2021    ALKPHOS 46 10/08/2021    BILITOTAL 0.8 10/08/2021     No results found for: UAMP, UBARB, BENZODIAZEUR, UCANN, UCOC, OPIT, UPCP    Recent Labs   Lab Test 10/08/21  1046      POTASSIUM 4.9   CHLORIDE 106   CO2 26   ANIONGAP 8   GLC 84   BUN 15   CR 1.14*   LEONEL 9.6       No results found for: SANDY Horowitz DO  Board Certified in Internal Medicine and Sleep  Medicine    (Note created with Dragon voice recognition and unintended spelling errors and word substitutions may occur)

## 2022-10-26 NOTE — PATIENT INSTRUCTIONS
What is a Home Sleep Study?    your doctor can give you a portable sleep monitor to use at home, so you don t have to spend the night in the sleep lab. But you should use a portable monitor only if:   ?Your doctor thinks you have a condition that makes you stop breathing for short periods while you are asleep, called  sleep apnea.    ?You do not have other serious medical problems, such as heart disease or lung disease.    Please bring the home sleep study device back to the sleep center as soon as you are finished with it so we can score it.     The cost of care estimate line is [phone]. They are able to give the patient an estimate of the charges and also an estimate of their insurance coverage/patient responsibility.   After your sleep study is performed, please call us at [phone] or [phone] to schedule for a follow up to review the results of the sleep study.    Consider using one tab of low dose melatonin 3 mg or less on the night of the study.    It is completely voluntary.    Do not drive or operate machinery after intake of melatonin.     Due to the pandemic, we have many people waiting in line for sleep studies- this wait list is improving each week.   You should receive a call within 2 weeks from our staff to schedule you for  your test.   Depending on the delay in approval by your insurance carrier, the study will be completed within a few days to 2 weeks of that call.    Please make every effort you can to sleep on your back with one pillow behind your head.    Please also call your insurance company next week to see if your sleep study is approved and find out your co-pay.

## 2022-10-27 ENCOUNTER — TELEPHONE (OUTPATIENT)
Dept: SLEEP MEDICINE | Facility: CLINIC | Age: 52
End: 2022-10-27

## 2022-10-28 ENCOUNTER — TELEPHONE (OUTPATIENT)
Dept: SLEEP MEDICINE | Facility: CLINIC | Age: 52
End: 2022-10-28

## 2022-10-28 NOTE — TELEPHONE ENCOUNTER
Reason for Call:  Other appointment    Detailed comments: Scheduled pt for hst  on 11/29 at [facility]. No available drop off times 11/30. PT is available anytime that day to drop off. Please inform PT if this is okay.     Phone Number Patient can be reached at: Cell number on file:    Telephone Information:   Mobile [phone]   Mobile [phone]       Best Time: anytime    Can we leave a detailed message on this number? YES    Call taken on 10/28/2022 at 4:30 PM by Blessing Larson

## 2022-11-08 NOTE — TELEPHONE ENCOUNTER
Spoke to patient and let her know that this appointment is okay and that she should arrive at 10;00 AM for her appointment. Gave her my number to call back if she has any other questions.    Rosette Holliday CMA, HST Specialist  [place] [facility]

## 2022-12-06 ENCOUNTER — OFFICE VISIT (OUTPATIENT)
Dept: SLEEP MEDICINE | Facility: CLINIC | Age: 52
End: 2022-12-06
Attending: INTERNAL MEDICINE
Payer: COMMERCIAL

## 2022-12-06 DIAGNOSIS — G47.63 SLEEP RELATED BRUXISM: ICD-10-CM

## 2022-12-06 DIAGNOSIS — R06.83 SNORING: ICD-10-CM

## 2022-12-06 DIAGNOSIS — G47.19 EXCESSIVE DAYTIME SLEEPINESS: ICD-10-CM

## 2022-12-06 DIAGNOSIS — R29.818 SUSPECTED SLEEP APNEA: ICD-10-CM

## 2022-12-06 PROCEDURE — G0399 HOME SLEEP TEST/TYPE 3 PORTA: HCPCS | Performed by: INTERNAL MEDICINE

## 2022-12-06 NOTE — PROGRESS NOTES
Pt is completing a home sleep test. Pt was instructed on how to put on the Noxturnal T3 device and associated equipment before going to bed and given the opportunity to practice putting it on before leaving the sleep center. Pt was reminded to bring the home sleep test kit back to the center tomorrow, at agreed upon time for download and reporting.   Rosette Holliday CMA, HST Specialist  [place] [facility]

## 2022-12-07 ENCOUNTER — DOCUMENTATION ONLY (OUTPATIENT)
Dept: SLEEP MEDICINE | Facility: CLINIC | Age: 52
End: 2022-12-07
Payer: COMMERCIAL

## 2022-12-07 NOTE — PROGRESS NOTES
This HSAT was performed using a Noxturnal T3 device which recorded snore, sound, movement activity, body position, nasal pressure, oronasal thermal airflow, pulse, oximetry and both chest and abdominal respiratory effort. HSAT data was restricted to the time patient states they were in bed.     HSAT was scored using 1B 4% hypopnea rule.     HST AHI (Non-PAT): 4.3  Snoring was reported as loud.  Time with SpO2 below 89% was 0 minutes.   Overall signal quality was good     Pt will follow up with sleep provider to determine appropriate therapy.

## 2022-12-07 NOTE — PROGRESS NOTES
HST POST-STUDY QUESTIONNAIRE    1. What time did you go to bed?  9:45  2. How long do you think it took to fall asleep?  15 min  3. What time did you wake up to start the day?  7 am  4. Did you get up during the night at all?  yes  5. If you woke up, do you remember approximately what time(s)? 11, 11:30, 3  6. Did you have any difficulty with the equipment?  No  7. Did you us any type of treatment with this study?  None  8. Was the head of the bed elevated? No  9. Did you sleep in a recliner?  No  10. Did you stop using CPAP at least 3 days before this test?  NA  11. Any other information you'd like us to know? no

## 2022-12-08 NOTE — PROCEDURES
"HOME SLEEP STUDY INTERPRETATION    Patient: Tamera Spence  MRN: [id number]  YOB: 1970  Study Date: 12/6/2022  Referring Provider: Allan To MD  Ordering Provider: Arya Horowitz DO     Indications for Home Study: Tamera Spence is a 52 year old female who presents with symptoms suggestive of obstructive sleep apnea.    Estimated body mass index is 27.89 kg/m  as calculated from the following:    Height as of 10/26/22: 1.803 m (5' 11\").    Weight as of 10/26/22: 90.7 kg (200 lb).  Total score - [place]: 8 (10/26/2022  8:09 AM)  Total Score: 3 (10/26/2022  8:27 AM)    Data: A full night home sleep study was performed recording the standard physiologic parameters including body position, movement, sound, nasal pressure, thermal oral airflow, chest and abdominal movements with respiratory inductance plethysmography, and oxygen saturation by pulse oximetry. Pulse rate was estimated by oximetry recording. This study was considered adequate based on > 4 hours of quality oximetry and respiratory recording. As specified by the AASM Manual for the Scoring of Sleep and Associated events, version 2.3, Rule VIII.D 1B, 4% oxygen desaturation scoring for hypopneas is used as a standard of care on all home sleep apnea testing.    Analysis Time:  569 minutes    Respiration:   Sleep Associated Hypoxemia: sustained hypoxemia was not present. Baseline oxygen saturation was 95%.  Time with saturation less than or equal to 88% was 0 minutes. The lowest oxygen saturation was 90%.   Snoring: Snoring was present.  Respiratory events: The home study revealed a presence of 7 obstructive apneas and 1 mixed and central apneas. There were 33 hypopneas resulting in a combined apnea/hypopnea index [AHI] of 4.3 events per hour.  AHI was 4.9 per hour supine, 23.2 per hour prone, 2.4 per hour on left side, and 4.4 per hour on right side.   Pattern: Excluding events noted above, respiratory rate and pattern was " Normal.    Position: Percent of time spent: supine - 39%, prone - 0.5%, on left - 17.8%, on right - 42.7%.    Heart Rate: By pulse oximetry normal rate was noted.     Assessment:   Patient did not rule in for obstructive sleep apnea.  Sleep associated hypoxemia was not present.    Recommendations:  Consider oral appliance therapy or positional therapy.  Suggest optimizing sleep hygiene and avoiding sleep deprivation.  Weight management.    Diagnosis Code(s): Snoring R06.83    Arya Horowitz DO, December 8, 2022   Diplomate, American Board of Internal Medicine, Sleep Medicine

## 2022-12-24 ENCOUNTER — HEALTH MAINTENANCE LETTER (OUTPATIENT)
Age: 52
End: 2022-12-24

## 2022-12-30 ENCOUNTER — OFFICE VISIT (OUTPATIENT)
Dept: INTERNAL MEDICINE | Facility: CLINIC | Age: 52
End: 2022-12-30
Payer: COMMERCIAL

## 2022-12-30 VITALS
SYSTOLIC BLOOD PRESSURE: 116 MMHG | RESPIRATION RATE: 17 BRPM | BODY MASS INDEX: 26.33 KG/M2 | OXYGEN SATURATION: 100 % | HEART RATE: 67 BPM | DIASTOLIC BLOOD PRESSURE: 64 MMHG | HEIGHT: 72 IN | WEIGHT: 194.4 LBS | TEMPERATURE: 97.9 F

## 2022-12-30 DIAGNOSIS — Z23 HIGH PRIORITY FOR 2019-NCOV VACCINE: ICD-10-CM

## 2022-12-30 DIAGNOSIS — Z13.1 SCREENING FOR DIABETES MELLITUS: ICD-10-CM

## 2022-12-30 DIAGNOSIS — Z13.220 SCREENING FOR HYPERLIPIDEMIA: ICD-10-CM

## 2022-12-30 DIAGNOSIS — F33.41 RECURRENT MAJOR DEPRESSIVE DISORDER, IN PARTIAL REMISSION (H): ICD-10-CM

## 2022-12-30 DIAGNOSIS — Z12.11 ENCOUNTER FOR SCREENING COLONOSCOPY: ICD-10-CM

## 2022-12-30 DIAGNOSIS — Z01.818 PREOPERATIVE EXAMINATION: Primary | ICD-10-CM

## 2022-12-30 DIAGNOSIS — G47.19 EXCESSIVE DAYTIME SLEEPINESS: ICD-10-CM

## 2022-12-30 DIAGNOSIS — Z00.00 ANNUAL PHYSICAL EXAM: ICD-10-CM

## 2022-12-30 DIAGNOSIS — H26.9 CATARACT OF RIGHT EYE, UNSPECIFIED CATARACT TYPE: ICD-10-CM

## 2022-12-30 LAB
ALBUMIN SERPL BCG-MCNC: 4.4 G/DL (ref 3.5–5.2)
ALBUMIN UR-MCNC: NEGATIVE MG/DL
ALP SERPL-CCNC: 50 U/L (ref 35–104)
ALT SERPL W P-5'-P-CCNC: 25 U/L (ref 10–35)
ANION GAP SERPL CALCULATED.3IONS-SCNC: 12 MMOL/L (ref 7–15)
APPEARANCE UR: CLEAR
AST SERPL W P-5'-P-CCNC: 25 U/L (ref 10–35)
BILIRUB SERPL-MCNC: 0.4 MG/DL
BILIRUB UR QL STRIP: NEGATIVE
BUN SERPL-MCNC: 15.9 MG/DL (ref 6–20)
CALCIUM SERPL-MCNC: 9.3 MG/DL (ref 8.6–10)
CHLORIDE SERPL-SCNC: 105 MMOL/L (ref 98–107)
CHOLEST SERPL-MCNC: 188 MG/DL
COLOR UR AUTO: YELLOW
CREAT SERPL-MCNC: 1.1 MG/DL (ref 0.51–0.95)
DEPRECATED HCO3 PLAS-SCNC: 24 MMOL/L (ref 22–29)
ERYTHROCYTE [DISTWIDTH] IN BLOOD BY AUTOMATED COUNT: 12.6 % (ref 10–15)
GFR SERPL CREATININE-BSD FRML MDRD: 60 ML/MIN/1.73M2
GLUCOSE SERPL-MCNC: 86 MG/DL (ref 70–99)
GLUCOSE UR STRIP-MCNC: NEGATIVE MG/DL
HBA1C MFR BLD: 5.2 % (ref 0–5.6)
HCT VFR BLD AUTO: 43.1 % (ref 35–47)
HDLC SERPL-MCNC: 46 MG/DL
HGB BLD-MCNC: 14.7 G/DL (ref 11.7–15.7)
HGB UR QL STRIP: NEGATIVE
KETONES UR STRIP-MCNC: NEGATIVE MG/DL
LDLC SERPL CALC-MCNC: 127 MG/DL
LEUKOCYTE ESTERASE UR QL STRIP: NEGATIVE
MCH RBC QN AUTO: 29.6 PG (ref 26.5–33)
MCHC RBC AUTO-ENTMCNC: 34.1 G/DL (ref 31.5–36.5)
MCV RBC AUTO: 87 FL (ref 78–100)
NITRATE UR QL: NEGATIVE
NONHDLC SERPL-MCNC: 142 MG/DL
PH UR STRIP: 6.5 [PH] (ref 5–8)
PLATELET # BLD AUTO: 331 10E3/UL (ref 150–450)
POTASSIUM SERPL-SCNC: 4.2 MMOL/L (ref 3.4–5.3)
PROT SERPL-MCNC: 7.2 G/DL (ref 6.4–8.3)
RBC # BLD AUTO: 4.97 10E6/UL (ref 3.8–5.2)
SODIUM SERPL-SCNC: 141 MMOL/L (ref 136–145)
SP GR UR STRIP: 1.02 (ref 1–1.03)
TRIGL SERPL-MCNC: 74 MG/DL
TSH SERPL DL<=0.005 MIU/L-ACNC: 1.32 UIU/ML (ref 0.3–4.2)
UROBILINOGEN UR STRIP-ACNC: 0.2 E.U./DL
WBC # BLD AUTO: 4.4 10E3/UL (ref 4–11)

## 2022-12-30 PROCEDURE — 91313 COVID-19 VACCINE BIVALENT BOOSTER 18+ (MODERNA): CPT | Performed by: INTERNAL MEDICINE

## 2022-12-30 PROCEDURE — 80053 COMPREHEN METABOLIC PANEL: CPT | Performed by: INTERNAL MEDICINE

## 2022-12-30 PROCEDURE — 81003 URINALYSIS AUTO W/O SCOPE: CPT | Performed by: INTERNAL MEDICINE

## 2022-12-30 PROCEDURE — 83036 HEMOGLOBIN GLYCOSYLATED A1C: CPT | Performed by: INTERNAL MEDICINE

## 2022-12-30 PROCEDURE — 99396 PREV VISIT EST AGE 40-64: CPT | Mod: 25 | Performed by: INTERNAL MEDICINE

## 2022-12-30 PROCEDURE — 0134A COVID-19 VACCINE BIVALENT BOOSTER 18+ (MODERNA): CPT | Performed by: INTERNAL MEDICINE

## 2022-12-30 PROCEDURE — 99214 OFFICE O/P EST MOD 30 MIN: CPT | Mod: 25 | Performed by: INTERNAL MEDICINE

## 2022-12-30 PROCEDURE — 36415 COLL VENOUS BLD VENIPUNCTURE: CPT | Performed by: INTERNAL MEDICINE

## 2022-12-30 PROCEDURE — 80061 LIPID PANEL: CPT | Performed by: INTERNAL MEDICINE

## 2022-12-30 PROCEDURE — 84443 ASSAY THYROID STIM HORMONE: CPT | Performed by: INTERNAL MEDICINE

## 2022-12-30 PROCEDURE — 85027 COMPLETE CBC AUTOMATED: CPT | Performed by: INTERNAL MEDICINE

## 2022-12-30 RX ORDER — CITALOPRAM HYDROBROMIDE 20 MG/1
20 TABLET ORAL DAILY PRN
Start: 2022-12-30

## 2022-12-30 ASSESSMENT — PATIENT HEALTH QUESTIONNAIRE - PHQ9
SUM OF ALL RESPONSES TO PHQ QUESTIONS 1-9: 6
SUM OF ALL RESPONSES TO PHQ QUESTIONS 1-9: 6
10. IF YOU CHECKED OFF ANY PROBLEMS, HOW DIFFICULT HAVE THESE PROBLEMS MADE IT FOR YOU TO DO YOUR WORK, TAKE CARE OF THINGS AT HOME, OR GET ALONG WITH OTHER PEOPLE: SOMEWHAT DIFFICULT

## 2022-12-30 NOTE — PROGRESS NOTES
Preoperative Consultation   Tamera Spence   52 year old  female    Date of visit: 12/30/2022  Physician: Allan To MD    This is a preoperative consultation requested by Dr. Campa in preparation for right cataract surgery on 1/17/2023 at [facility] in [place].       Assessment and Plan   Tamera Spence was seen in preoperative consultation in preparation for right cataract surgery.  This is a low risk surgery and the patient has no increased risk for major cardiac complications.  Please note she has no cardiopulmonary contraindication for surgery and will proceed without further cardiopulmonary testing    1. Preoperative examination    2. Cataract of right eye, unspecified cataract type    3. Annual physical exam    4. Encounter for screening colonoscopy    5. Screening for hyperlipidemia    6. Screening for diabetes mellitus    7. High priority for 2019-nCoV vaccine    8. Excessive daytime sleepiness    9. Recurrent major depressive disorder, in partial remission (H)         Patient Profile   Social History     Social History Narrative    . She has son (tranistioned from girl to boy), Anthony (2009), and daughter Mariya (2006). 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  She is originally from [place], [place], and  attended college at the [employer] and [employer].         Past Medical History   Patient Active Problem List   Diagnosis     Recurrent major depressive disorder, in partial remission (H)       Past Surgical History  She has a past surgical history that includes TRANSFER OF EMBRYO,INTRAUTERINE (2005,2008); [place] Tooth Extraction; and pr home sleep test/type 3 neelam (12/8/2022).     History of Present Illness   This 52 year old woman comes in for preoperative evaluation.  She will be having cataract surgery.  She is generally feeling well.  Recently had a study for sleep apnea which did not  "show any significant sleep apnea.  She still feels tired throughout the day and will be following up with a sleep physician.  She does not have multiple sleep latency test.  Otherwise doing well.  Mood is generally stable.  Reflexes been well controlled without medication.    Recent antiplatelet use: no  Personal or family history of bleeding or clotting disorders: no  Steroid use in the past year: no  Personal or family history of difficulty with anesthesia: no  Current cardiopulmonary symptoms: no  Last Menstrual Period: IUD    Review of Systems: A comprehensive review of systems was negative except as noted.     Medications and Allergies   Current Outpatient Medications   Medication Sig Dispense Refill     citalopram (CELEXA) 20 MG tablet Take 1 tablet (20 mg) by mouth daily as needed (depression (not currently taking 12/30/22))       Allergies   Allergen Reactions     No Known Drug Allergies         Family and Social History   Family History   Problem Relation Age of Onset     Hypertension Mother      Depression Mother      Psychotic Disorder Mother         adhd     Hypertension Father      Musculoskeletal Disorder Father         multiple joint, back problems     Sleep Apnea Father      Attention Deficit Disorder Sister      Psychotic Disorder Sister      Depression Sister      Attention Deficit Disorder Sister      No Known Problems Daughter      No Known Problems Son         Social History     Tobacco Use     Smoking status: Never     Smokeless tobacco: Never   Substance Use Topics     Alcohol use: Yes     Comment: Alcoholic Drinks/day: 1/month     Drug use: No        Physical Exam   General Appearance:   No acute distress    /64 (BP Location: Left arm, Patient Position: Sitting, Cuff Size: Adult Large)   Pulse 67   Temp 97.9  F (36.6  C) (Tympanic)   Resp 17   Ht 1.848 m (6' 0.75\")   Wt 88.2 kg (194 lb 6.4 oz)   SpO2 100%   BMI 25.82 kg/m      EYES: Eyelids, conjunctiva, and sclera were normal. "   HEAD, EARS, NOSE, MOUTH, AND THROAT: Head and face were normal. Hearing was normal to voice and the ears were normal to external exam.   NECK: Neck appearance was normal. There were no neck masses and the thyroid was not enlarged.  RESPIRATORY: Breathing pattern was normal and the chest moved symmetrically.  Percussion/auscultatory percussion was normal.  Lung sounds were normal and there were no abnormal sounds.  CARDIOVASCULAR: Heart rate and rhythm were normal.  S1 and S2 were normal and there were no extra sounds or murmurs. Peripheral pulses in arms and legs were normal.  Jugular venous pressure was normal.  There was no peripheral edema.  GASTROINTESTINAL: The abdomen was normal in contour.  Bowel sounds were present.  Percussion detected no organ enlargement or tenderness.  Palpation detected no tenderness, mass, or enlarged organs.   MUSCULOSKELETAL: Skeletal configuration was normal and muscle mass was normal for age. Joint appearance was overall normal.  LYMPHATIC: There were no enlarged nodes.  SKIN/HAIR/NAILS: Skin color was normal.  There were no skin lesions.  Hair and nails were normal.  NEUROLOGIC: The patient was alert and oriented to person, place, time, and circumstance. Speech was normal. Cranial nerves were normal. Motor strength was normal for age. The patient was normally coordinated.  PSYCHIATRIC:  Mood and affect were normal and the patient had normal recent and remote memory. The patient's judgment and insight were normal.       Additional Tests        Allan To MD  Internal Medicine  Contact me at [phone]

## 2022-12-30 NOTE — PROGRESS NOTES
Office Visit - Physical   Tamera Spence   52 year old  female    Date of visit: 12/30/2022  Physician: Allan To MD     Assessment and Plan   . Annual physical exam  This is a 52-year-old woman with issues as discussed below.  Ongoing healthy lifestyle is discussed and recommended.  Discussed strategies for increased physical activity/exercise and healthy diet    4. Encounter for screening colonoscopy  - COLOGUARD(EX[employer] SCI[employer]ES); Future    5. Screening for hyperlipidemia  - Lipid panel reflex to direct LDL Fasting; Future  - Lipid panel reflex to direct LDL Fasting    6. Screening for diabetes mellitus  - Hemoglobin A1c; Future  - Hemoglobin A1c    7. High priority for 2019-nCoV vaccine  - COVID-19,PF,MODERNA BIVALENT 18+Yrs    8. Excessive daytime sleepiness  Recently had a sleep study which we reviewed and generally looks okay.  She will be following up with sleep medicine  - CBC with platelets; Future  - Comprehensive metabolic panel; Future  - UA reflex to Microscopic and Culture; Future  - CBC with platelets  - Comprehensive metabolic panel  - UA reflex to Microscopic and Culture    9. Recurrent major depressive disorder, in partial remission (H)  Uses citalopram intermittently  - citalopram (CELEXA) 20 MG tablet; Take 1 tablet (20 mg) by mouth daily as needed (depression (not currently taking 12/30/22))  - TSH with free T4 reflex; Future  - TSH with free T4 reflex    Return in about 1 year (around 12/30/2023) for Routine preventive.     Chief Complaint   Chief Complaint   Patient presents with     Recheck Medication     Pre-Op Exam     Imm/Inj     COVID-19 VACCINE        Patient Profile   Social History     Social History Narrative    . She has son (tranistioned from girl to boy), Anthony (2009), and daughter Mariya (2006). 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  She is originally from [place], [place], and  attended  "college at the [employer].         Past Medical History   Patient Active Problem List   Diagnosis     Recurrent major depressive disorder, in partial remission (H)       Past Surgical History  She has a past surgical history that includes TRANSFER OF EMBRYO,INTRAUTERINE (2005,2008); [place] Tooth Extraction; and pr home sleep test/type 3 neelam (12/8/2022).     History of Present Illness   This 52 year old woman comes in for preoperative physical    Review of Systems: A comprehensive review of systems was negative except as noted.     Medications and Allergies   Current Outpatient Medications   Medication Sig Dispense Refill     citalopram (CELEXA) 20 MG tablet Take 1 tablet (20 mg) by mouth daily as needed (depression (not currently taking 12/30/22))       Allergies   Allergen Reactions     No Known Drug Allergies         Family and Social History   Family History   Problem Relation Age of Onset     Hypertension Mother      Depression Mother      Psychotic Disorder Mother         adhd     Hypertension Father      Musculoskeletal Disorder Father         multiple joint, back problems     Sleep Apnea Father      Attention Deficit Disorder Sister      Psychotic Disorder Sister      Depression Sister      Attention Deficit Disorder Sister      No Known Problems Daughter      No Known Problems Son         Social History     Tobacco Use     Smoking status: Never     Smokeless tobacco: Never   Substance Use Topics     Alcohol use: Yes     Comment: Alcoholic Drinks/day: 1/month     Drug use: No        Physical Exam   General Appearance:   No acute distress    /64 (BP Location: Left arm, Patient Position: Sitting, Cuff Size: Adult Large)   Pulse 67   Temp 97.9  F (36.6  C) (Tympanic)   Resp 17   Ht 1.848 m (6' 0.75\")   Wt 88.2 kg (194 lb 6.4 oz)   SpO2 100%   BMI 25.82 kg/m    See preoperative     Additional Information        Allan To MD  Internal Medicine  Contact me at " [phone]  Answers for HPI/ROS submitted by the patient on 12/30/2022  If you checked off any problems, how difficult have these problems made it for you to do your work, take care of things at home, or get along with other people?: Somewhat difficult  PHQ9 TOTAL SCORE: 6

## 2023-01-13 ENCOUNTER — TELEPHONE (OUTPATIENT)
Dept: SLEEP MEDICINE | Facility: CLINIC | Age: 53
End: 2023-01-13

## 2023-01-13 ENCOUNTER — VIRTUAL VISIT (OUTPATIENT)
Dept: SLEEP MEDICINE | Facility: CLINIC | Age: 53
End: 2023-01-13
Payer: COMMERCIAL

## 2023-01-13 VITALS — WEIGHT: 200 LBS | BODY MASS INDEX: 28 KG/M2 | HEIGHT: 71 IN

## 2023-01-13 DIAGNOSIS — R29.818 SUSPECTED SLEEP APNEA: Primary | ICD-10-CM

## 2023-01-13 DIAGNOSIS — G47.63 SLEEP RELATED BRUXISM: ICD-10-CM

## 2023-01-13 DIAGNOSIS — R06.83 SNORING: ICD-10-CM

## 2023-01-13 DIAGNOSIS — G47.19 EXCESSIVE DAYTIME SLEEPINESS: ICD-10-CM

## 2023-01-13 PROCEDURE — 99213 OFFICE O/P EST LOW 20 MIN: CPT | Mod: GT | Performed by: INTERNAL MEDICINE

## 2023-01-13 ASSESSMENT — SLEEP AND FATIGUE QUESTIONNAIRES
HOW LIKELY ARE YOU TO NOD OFF OR FALL ASLEEP WHILE LYING DOWN TO REST IN THE AFTERNOON WHEN CIRCUMSTANCES PERMIT: SLIGHT CHANCE OF DOZING
HOW LIKELY ARE YOU TO NOD OFF OR FALL ASLEEP WHEN YOU ARE A PASSENGER IN A CAR FOR AN HOUR WITHOUT A BREAK: MODERATE CHANCE OF DOZING
HOW LIKELY ARE YOU TO NOD OFF OR FALL ASLEEP WHILE SITTING INACTIVE IN A PUBLIC PLACE: WOULD NEVER DOZE
HOW LIKELY ARE YOU TO NOD OFF OR FALL ASLEEP WHILE WATCHING TV: WOULD NEVER DOZE
HOW LIKELY ARE YOU TO NOD OFF OR FALL ASLEEP WHILE SITTING AND READING: WOULD NEVER DOZE
HOW LIKELY ARE YOU TO NOD OFF OR FALL ASLEEP WHILE SITTING AND TALKING TO SOMEONE: WOULD NEVER DOZE
HOW LIKELY ARE YOU TO NOD OFF OR FALL ASLEEP WHILE SITTING QUIETLY AFTER LUNCH WITHOUT ALCOHOL: WOULD NEVER DOZE
HOW LIKELY ARE YOU TO NOD OFF OR FALL ASLEEP IN A CAR, WHILE STOPPED FOR A FEW MINUTES IN TRAFFIC: WOULD NEVER DOZE

## 2023-01-13 ASSESSMENT — PAIN SCALES - GENERAL: PAINLEVEL: NO PAIN (0)

## 2023-01-13 NOTE — PATIENT INSTRUCTIONS
Patient education: What is a sleep study?     What is a sleep study? -- A sleep study is a test that measures how well you sleep and checks for sleep problems. For some sleep studies, you stay overnight in a sleep lab at a hospital or sleep center.     What happens during a sleep study? -- Before you go to sleep, a technician attaches small, sticky patches called  electrodes  to your head, chest, and legs. He or she will also place a small tube beneath your nose and might wrap 1 or 2 belts around your chest.   Each of these items has wires that connect to monitors. The monitors record your movement, brain activity, breathing, and other body functions while you sleep.  If you have a history of trouble falling asleep, your doctor might prescribe a medicine to help you fall asleep in the lab. If you have never taken the medicine before, your doctor might ask you take it on a night before your sleep study to see how it affects you.   Why might my doctor order a sleep study? -- Your doctor will order a sleep study if he or she thinks you have sleep apnea or a different condition that makes you:   ?Have sudden jerking leg movements while you sleep, called  periodic limb movements.    ?Feel very sleepy during the day and fall asleep all of a sudden, called  narcolepsy.    ?Have trouble falling asleep or staying asleep over a long period of time, called  chronic insomnia.    ?Do odd things while you sleep, such as walking.  How should I prepare for a sleep study? -- On the day of your sleep study, you should:   ?Avoid alcohol   ?Avoid drinking coffee, tea, sodas, and other drinks that have caffeine in the afternoon and evening   ?Take all of your regular medicines     The cost of care estimate line is [phone]. They are able to give the patient an estimate of the charges and also an estimate of their insurance coverage/patient responsibility.   After your sleep study is performed, please call us at [phone] or  [phone]  to schedule for a follow up to review the results of the sleep study.    Please bring one tab of low dose melatonin 3 mg or less to the night of the study.    Melatonin intake is completely voluntary.    You may take own melatonin after arrival to sleep center. Do not drive or operate machinery after intake of melatonin.     Please make every effort you can to sleep on your back with one pillow behind your head.    Please also call your insurance company next week to see if your sleep study is approved and find out your co-pay.

## 2023-01-13 NOTE — PROGRESS NOTES
Tamera is a 52 year old who is being evaluated via a billable video visit.      How would you like to obtain your AVS? MyChart  If the video visit is dropped, the invitation should be resent by: Send to e-mail at: [email or web address].  Will anyone else be joining your video visit? Mecca Samano    Video-Visit Details    Type of service:  Video Visit     Originating Location (pt. Location): Home    Distant Location (provider location):  Off-site     Platform used for Video Visit: Twibingo     Additional 10 minutes on the date of service was spent performing the following:    -Preparing to see the patient (eg, review of tests)     -Ordering medications, tests, or procedures   -Documenting clinical information in the electronic or other health record     Thank you for the opportunity to participate in the care of Tamera Spence.     She is a 52 year old  female patient who comes to the sleep medicine clinic for review of sleep study results. The study was completed on 12/06/2022 which showed that the patient had snoring but did not rule in for obstructive sleep apnea.    Assessment and Plan:  In summary Tamera Spence is a 52 year old year old female here for review of sleep study results.  1. Suspected sleep apnea/Excessive daytime sleepiness/Snoring/Sleep related bruxism  The patient disputes the results of the home sleep study and would like to proceed with an in lab sleep study.  Advised patient to follow-up with me after the sleep study has been completed.  - Comprehensive Sleep Study; Future    LISA:  LISA Total Score: 14  Total score - [place]: 3 (1/13/2023  9:07 AM)    Patient Active Problem List   Diagnosis     Recurrent major depressive disorder, in partial remission (H)       Current Outpatient Medications   Medication Sig Dispense Refill     citalopram (CELEXA) 20 MG tablet Take 1 tablet (20 mg) by mouth daily as needed (depression (not currently taking 12/30/22))         Allergies   Allergen  Reactions     No Known Drug Allergies        Physical Exam:  GEN: NAD  Psych: normal mood, normal affect     Labs/Studies:  - We reviewed the results of the overnight study as described on the HPI.       Patient verbalized understanding of these issues, agrees with the plan and all questions were answered today. Patient was given an opportuntity to voice any other symptoms or concerns not listed above. Patient did not have any other symptoms or concerns.      Arya Horowitz DO  Board Certified in Internal Medicine and Sleep Medicine      (Note created with Dragon voice recognition and unintended spelling errors and word substitutions may occur)

## 2023-01-18 ENCOUNTER — TRANSFERRED RECORDS (OUTPATIENT)
Dept: HEALTH INFORMATION MANAGEMENT | Facility: CLINIC | Age: 53
End: 2023-01-18

## 2023-02-27 ENCOUNTER — TRANSFERRED RECORDS (OUTPATIENT)
Dept: HEALTH INFORMATION MANAGEMENT | Facility: CLINIC | Age: 53
End: 2023-02-27

## 2023-03-28 ENCOUNTER — MYC MEDICAL ADVICE (OUTPATIENT)
Dept: INTERNAL MEDICINE | Facility: CLINIC | Age: 53
End: 2023-03-28
Payer: COMMERCIAL

## 2023-03-29 NOTE — TELEPHONE ENCOUNTER
I would recommend the patient make an appointment for further evaluation.  I would be happy to see her in clinic, or she could go to one of the walk-in care centers at [facility] which are open 7 days a week from 8 AM until 8 PM

## 2023-03-30 ENCOUNTER — LAB (OUTPATIENT)
Dept: INTERNAL MEDICINE | Facility: CLINIC | Age: 53
End: 2023-03-30
Payer: COMMERCIAL

## 2023-03-30 DIAGNOSIS — Z12.11 ENCOUNTER FOR SCREENING COLONOSCOPY: ICD-10-CM

## 2023-04-11 ENCOUNTER — TELEPHONE (OUTPATIENT)
Dept: SLEEP MEDICINE | Facility: CLINIC | Age: 53
End: 2023-04-11

## 2023-04-11 NOTE — TELEPHONE ENCOUNTER
General Call      Reason for Call:  Patient has a sleep study scheduled for tonight. She has a cold, and is wondering if it is still ok to do the sleep study.    What are your questions or concerns:  NA    Date of last appointment with provider: NA    Could we send this information to you in [employer] or would you prefer to receive a phone call?:   Patient would prefer a phone call   Okay to leave a detailed message?: Yes at Cell number on file:    Telephone Information:   Mobile [phone]   Mobile [phone]

## 2023-04-11 NOTE — TELEPHONE ENCOUNTER
Spoke with patient. She is congested and it is mildly affecting her sleep. She has already done HST so she would like most accurate PSG results and would like to reschedule. Transferred to central scheduling.     Carmenza ALICIA RN  [facility]

## 2023-04-13 ENCOUNTER — TRANSFERRED RECORDS (OUTPATIENT)
Dept: HEALTH INFORMATION MANAGEMENT | Facility: CLINIC | Age: 53
End: 2023-04-13

## 2023-04-24 ENCOUNTER — TRANSFERRED RECORDS (OUTPATIENT)
Dept: HEALTH INFORMATION MANAGEMENT | Facility: CLINIC | Age: 53
End: 2023-04-24
Payer: COMMERCIAL

## 2023-04-26 ENCOUNTER — THERAPY VISIT (OUTPATIENT)
Dept: PHYSICAL THERAPY | Facility: CLINIC | Age: 53
End: 2023-04-26
Payer: COMMERCIAL

## 2023-04-26 DIAGNOSIS — M79.671 RIGHT FOOT PAIN: ICD-10-CM

## 2023-04-26 PROCEDURE — 97110 THERAPEUTIC EXERCISES: CPT | Mod: GP | Performed by: PHYSICAL THERAPIST

## 2023-04-26 PROCEDURE — 97161 PT EVAL LOW COMPLEX 20 MIN: CPT | Mod: GP | Performed by: PHYSICAL THERAPIST

## 2023-04-26 NOTE — PROGRESS NOTES
Physical Therapy Initial Evaluation  Subjective:  The history is provided by the patient.   Therapist Generated HPI Evaluation  Problem details: Pt presents to PT with chief complaint of R foot pain after buying a compact care about a year and a half ago affecting pt ability to drive and complete functional daily activities. Pain initially started in her knee and then felt her compensations led to R anterior foot/ankle pain. Pt recently received a R Knee CSI 2 days ago. Pt requires skilled PT in order to return to driving and completing daily functional activities without pain.     .         Type of problem:  Right foot.    This is a new condition.  Condition occurred with:  Other reason (possibly after buying new compact car a year ago).  Where condition occurred: in the community and for unknown reasons (driving).  Patient reports pain:  Anterior and joint.  Pain is described as aching and is constant.  Pain radiates to:  No radiation. Pain is the same all the time.  Since onset symptoms are gradually worsening.  Symptoms are exacerbated by certain positions and activity (driving, biking)  and relieved by ice.      Barriers include:  None as reported by patient.    Patient Health History  Tamera Spence being seen for Foot pain.     Problem began: 1/1/2022.   Problem occurred: Drivimg   Pain is reported as 2/10 on pain scale.  General health as reported by patient is good.  Pertinent medical history includes: depression.     Medical allergies: none.       Current medications:  None.    Current occupation is Researcher.   Primary job tasks include:  Computer work and prolonged sitting.                                    Objective:    Gait:    Gait Type:  Normal               Ankle/Foot Evaluation  ROM:    AROM:    Dorsiflexion:  Left:   15 deg, mod loss   Right:   15 deg, mod loss            PROM:    Dorsiflexion:  Left:    15 deg     Right:   15 deg                  Strength:    Dorsiflexion:  Left: 5/5      Pain:   Right: 4+/5   Strong/painful  Pain:    Inversion:Left: 4+/5  Pain:     Right: 4+/5  Pain:  Eversion:Left: 4+/5  Pain:  Right: 4+/5   Pain:+                  LIGAMENT TESTING: normal                      FUNCTIONAL TESTS:         Quad:  Single Leg Squat Left: Control is normal control.  Single Leg Squat Right: Control is mild loss of control  Bilateral Leg Squat: mod loss R ankle df at deep squat                                                            General     ROS    Assessment/Plan:    Patient is a 52 year old female with right side ankle complaints.    Patient has the following significant findings with corresponding treatment plan.                Diagnosis 1:  R foot pain  Pain -  hot/cold therapy, manual therapy and STS, taping/splinting/bracing, education, home exercise program  Decreased ROM/flexibility - manual therapy and therapeutic exercise  Decreased joint mobility - manual therapy and therapeutic exercise  Decreased strength - therapeutic exercise and therapeutic activities  Impaired balance - neuro re-education and therapeutic activities  Impaired muscle performance - neuro re-education  Decreased function - therapeutic activities    Therapy Evaluation Codes:     Cumulative Therapy Evaluation is: Low complexity.    Previous and current functional limitations:  (See Goal Flow Sheet for this information)    Short term and Long term goals: (See Goal Flow Sheet for this information)     Communication ability:  Patient appears to be able to clearly communicate and understand verbal and written communication and follow directions correctly.  Treatment Explanation - The following has been discussed with the patient:   RX ordered/plan of care  Anticipated outcomes  Possible risks and side effects  This patient would benefit from PT intervention to resume normal activities.   Rehab potential is good.    Frequency:  1 X week, once daily  Duration:  for 4 weeks then 2 x month for 1 month  Discharge  Plan:  Achieve all LTG.  Independent in home treatment program.  Reach maximal therapeutic benefit.    Please refer to the daily flowsheet for treatment today, total treatment time and time spent performing 1:1 timed codes.

## 2023-05-05 ENCOUNTER — THERAPY VISIT (OUTPATIENT)
Dept: PHYSICAL THERAPY | Facility: CLINIC | Age: 53
End: 2023-05-05
Payer: COMMERCIAL

## 2023-05-05 DIAGNOSIS — M79.671 RIGHT FOOT PAIN: Primary | ICD-10-CM

## 2023-05-05 PROCEDURE — 97112 NEUROMUSCULAR REEDUCATION: CPT | Mod: GP | Performed by: PHYSICAL THERAPIST

## 2023-05-05 PROCEDURE — 97110 THERAPEUTIC EXERCISES: CPT | Mod: GP | Performed by: PHYSICAL THERAPIST

## 2023-05-17 LAB — NONINV COLON CA DNA+OCC BLD SCRN STL QL: NEGATIVE

## 2023-05-23 ENCOUNTER — PATIENT OUTREACH (OUTPATIENT)
Dept: CARE COORDINATION | Facility: CLINIC | Age: 53
End: 2023-05-23
Payer: COMMERCIAL

## 2023-06-07 ENCOUNTER — TELEPHONE (OUTPATIENT)
Dept: SLEEP MEDICINE | Facility: CLINIC | Age: 53
End: 2023-06-07

## 2023-06-07 ENCOUNTER — THERAPY VISIT (OUTPATIENT)
Dept: PHYSICAL THERAPY | Facility: CLINIC | Age: 53
End: 2023-06-07
Payer: COMMERCIAL

## 2023-06-07 DIAGNOSIS — M79.671 RIGHT FOOT PAIN: Primary | ICD-10-CM

## 2023-06-07 PROCEDURE — 97110 THERAPEUTIC EXERCISES: CPT | Mod: GP | Performed by: PHYSICAL THERAPIST

## 2023-06-13 ENCOUNTER — LAB (OUTPATIENT)
Dept: LAB | Facility: CLINIC | Age: 53
End: 2023-06-13
Payer: COMMERCIAL

## 2023-06-13 DIAGNOSIS — H02.409 PTOSIS: Primary | ICD-10-CM

## 2023-06-13 PROCEDURE — 36415 COLL VENOUS BLD VENIPUNCTURE: CPT

## 2023-06-13 PROCEDURE — 83516 IMMUNOASSAY NONANTIBODY: CPT | Mod: 90

## 2023-06-13 PROCEDURE — 86255 FLUORESCENT ANTIBODY SCREEN: CPT | Mod: 90

## 2023-06-13 PROCEDURE — 99000 SPECIMEN HANDLING OFFICE-LAB: CPT

## 2023-06-13 PROCEDURE — 83519 RIA NONANTIBODY: CPT | Mod: 90

## 2023-06-15 ENCOUNTER — THERAPY VISIT (OUTPATIENT)
Dept: SLEEP MEDICINE | Facility: CLINIC | Age: 53
End: 2023-06-15
Attending: INTERNAL MEDICINE
Payer: COMMERCIAL

## 2023-06-15 DIAGNOSIS — R29.818 SUSPECTED SLEEP APNEA: ICD-10-CM

## 2023-06-15 DIAGNOSIS — G47.63 SLEEP RELATED BRUXISM: ICD-10-CM

## 2023-06-15 DIAGNOSIS — G47.19 EXCESSIVE DAYTIME SLEEPINESS: ICD-10-CM

## 2023-06-15 DIAGNOSIS — R06.83 SNORING: ICD-10-CM

## 2023-06-15 LAB — ACHR BIND AB SER-SCNC: 0 NMOL/L

## 2023-06-15 PROCEDURE — 95810 POLYSOM 6/> YRS 4/> PARAM: CPT | Performed by: INTERNAL MEDICINE

## 2023-06-15 ASSESSMENT — SLEEP AND FATIGUE QUESTIONNAIRES
HOW LIKELY ARE YOU TO NOD OFF OR FALL ASLEEP WHILE WATCHING TV: SLIGHT CHANCE OF DOZING
HOW LIKELY ARE YOU TO NOD OFF OR FALL ASLEEP WHILE SITTING QUIETLY AFTER LUNCH WITHOUT ALCOHOL: WOULD NEVER DOZE
HOW LIKELY ARE YOU TO NOD OFF OR FALL ASLEEP WHILE SITTING AND TALKING TO SOMEONE: WOULD NEVER DOZE
HOW LIKELY ARE YOU TO NOD OFF OR FALL ASLEEP WHILE LYING DOWN TO REST IN THE AFTERNOON WHEN CIRCUMSTANCES PERMIT: HIGH CHANCE OF DOZING
HOW LIKELY ARE YOU TO NOD OFF OR FALL ASLEEP IN A CAR, WHILE STOPPED FOR A FEW MINUTES IN TRAFFIC: WOULD NEVER DOZE
HOW LIKELY ARE YOU TO NOD OFF OR FALL ASLEEP WHILE SITTING AND READING: WOULD NEVER DOZE
HOW LIKELY ARE YOU TO NOD OFF OR FALL ASLEEP WHILE SITTING INACTIVE IN A PUBLIC PLACE: WOULD NEVER DOZE
HOW LIKELY ARE YOU TO NOD OFF OR FALL ASLEEP WHEN YOU ARE A PASSENGER IN A CAR FOR AN HOUR WITHOUT A BREAK: MODERATE CHANCE OF DOZING

## 2023-06-16 LAB
ACHR BLOCK AB/ACHR TOTAL SFR SER: 23 %
ACHR MOD AB/ACHR TOTAL SFR SER: 0 %

## 2023-06-16 NOTE — PATIENT INSTRUCTIONS
[facility]    1. Your sleep study will be reviewed by a sleep physician within the next few days.     2. Please follow up in the sleep clinic as scheduled, or, make an appointment with your sleep provider to be seen within two weeks to discuss the results of the sleep study.    3. If you have any questions or problems with your treatment plan, please contact your sleep clinic provider at [phone] to further manage your condition.    4. Please review your attached medication list, and, at your follow-up appointment advise your sleep clinic provider about any changes.    5. Go to [email or web address] for more information about your sleep problems.    MELODY Coyne  June 16, 2023

## 2023-06-17 LAB — MUSK AB SER QL: NORMAL

## 2023-06-22 LAB — SLPCOMP: NORMAL

## 2023-07-03 ENCOUNTER — VIRTUAL VISIT (OUTPATIENT)
Dept: SLEEP MEDICINE | Facility: CLINIC | Age: 53
End: 2023-07-03
Payer: COMMERCIAL

## 2023-07-03 VITALS — HEIGHT: 71 IN | WEIGHT: 195 LBS | BODY MASS INDEX: 27.3 KG/M2

## 2023-07-03 DIAGNOSIS — G47.33 OBSTRUCTIVE SLEEP APNEA: Primary | ICD-10-CM

## 2023-07-03 DIAGNOSIS — G47.19 EXCESSIVE DAYTIME SLEEPINESS: ICD-10-CM

## 2023-07-03 PROCEDURE — 99213 OFFICE O/P EST LOW 20 MIN: CPT | Mod: VID | Performed by: INTERNAL MEDICINE

## 2023-07-03 ASSESSMENT — SLEEP AND FATIGUE QUESTIONNAIRES
HOW LIKELY ARE YOU TO NOD OFF OR FALL ASLEEP WHILE SITTING QUIETLY AFTER LUNCH WITHOUT ALCOHOL: SLIGHT CHANCE OF DOZING
HOW LIKELY ARE YOU TO NOD OFF OR FALL ASLEEP IN A CAR, WHILE STOPPED FOR A FEW MINUTES IN TRAFFIC: WOULD NEVER DOZE
HOW LIKELY ARE YOU TO NOD OFF OR FALL ASLEEP WHILE SITTING AND TALKING TO SOMEONE: WOULD NEVER DOZE
HOW LIKELY ARE YOU TO NOD OFF OR FALL ASLEEP WHEN YOU ARE A PASSENGER IN A CAR FOR AN HOUR WITHOUT A BREAK: HIGH CHANCE OF DOZING
HOW LIKELY ARE YOU TO NOD OFF OR FALL ASLEEP WHILE WATCHING TV: SLIGHT CHANCE OF DOZING
HOW LIKELY ARE YOU TO NOD OFF OR FALL ASLEEP WHILE SITTING AND READING: SLIGHT CHANCE OF DOZING
HOW LIKELY ARE YOU TO NOD OFF OR FALL ASLEEP WHILE SITTING INACTIVE IN A PUBLIC PLACE: WOULD NEVER DOZE
HOW LIKELY ARE YOU TO NOD OFF OR FALL ASLEEP WHILE LYING DOWN TO REST IN THE AFTERNOON WHEN CIRCUMSTANCES PERMIT: HIGH CHANCE OF DOZING

## 2023-07-03 ASSESSMENT — PAIN SCALES - GENERAL: PAINLEVEL: NO PAIN (0)

## 2023-07-03 NOTE — NURSING NOTE
Is the patient currently in the state [facility]? YES    Visit mode:VIDEO    If the visit is dropped, the patient can be reconnected by: VIDEO VISIT: Send to e-mail at: [email or web address].    Will anyone else be joining the visit? NO      How would you like to obtain your AVS? MyChart    Are changes needed to the allergy or medication list? NO    Reason for visit: No chief complaint on file.

## 2023-07-03 NOTE — PROGRESS NOTES
Virtual Visit Details    Type of service:  Video Visit     Originating Location (pt. Location): Home  Distant Location (provider location):  Off-site  Platform used for Video Visit: AmCasaHop     Additional 10 minutes on the date of service was spent performing the following:    -Preparing to see the patient (eg, review of tests)   -Ordering medications, tests, or procedures   -Documenting clinical information in the electronic or other health record     Thank you for the opportunity to participate in the care of Tamera Spence.     She is a 52 year old  female patient who comes to the sleep medicine clinic for review of sleep study results. The patient had a sleep study performed on 06/15/2023 (AHI=10.1).    Assessment and Plan:  In summary Tamera Spence is a 52 year old year old female here for review of sleep study results.  1. Obstructive Sleep Apnea/Hypersomnia  We had an extensive conversation to review the results of her sleep study and to  her on the importance of treating sleep apnea. We discussed the options of treatment with oral appliance versus CPAP. Patient decided to proceed with CPAP. She will start using the device as soon as she receives it with the intention to use if for the entire night. We discussed some tips to increase PAP tolerance as well as the normal curve of adaptation. CPAP is going to provide improved respiratory function during the night but it can cause some sleep disruption that tends to improve with continuous usage. She should return to the clinic in 7 weeks to review compliance and efficacy monitoring. Since the patient does not have any preference, we will use [employer] as the durable medical equipment company.     LISA:  LISA Total Score: 13  Total score - [place]: 9 (7/3/2023  2:26 PM)    Patient Active Problem List   Diagnosis     Recurrent major depressive disorder, in partial remission (H)     Right foot pain       Current Outpatient Medications   Medication  Sig Dispense Refill     citalopram (CELEXA) 20 MG tablet Take 1 tablet (20 mg) by mouth daily as needed (depression (not currently taking 12/30/22))         Allergies   Allergen Reactions     No Known Drug Allergy        Visual Exam:  GEN: NAD  Psych: normal mood, normal affect    Labs/Studies:  - We reviewed the results of the overnight study as described on the HPI.       Patient verbalized understanding of these issues, agrees with the plan and all questions were answered today. Patient was given an opportuntity to voice any other symptoms or concerns not listed above. Patient did not have any other symptoms or concerns.      Arya Horowitz DO  Board Certified in Internal Medicine and Sleep Medicine      (Note created with Dragon voice recognition and unintended spelling errors and word substitutions may occur)

## 2023-07-03 NOTE — PATIENT INSTRUCTIONS
Equipment Instructions    We will process your PAP order and send it to a Durable Medical Equipment (DME) provider.    The medical equipment company should call you within 7 days.  If you have not heard from the company, please contact them to see if they received your order and are planning to call you.    Please call us at [phone] if you are unable to contact the medical equipment company or if they do not have the order.    If you are starting a new PAP machine, please call us after you use it the first night to let us know how it went. This call also helps us know that you received your equipment and that everything is ready. Please use our central phone number [phone]    Contact information for [employer] company:    [facility] Tel: [phone]

## 2023-07-25 ENCOUNTER — DOCUMENTATION ONLY (OUTPATIENT)
Dept: SLEEP MEDICINE | Facility: CLINIC | Age: 53
End: 2023-07-25
Payer: COMMERCIAL

## 2023-07-25 DIAGNOSIS — G47.14 HYPERSOMNIA DUE TO ANOTHER MEDICAL CONDITION: ICD-10-CM

## 2023-07-25 DIAGNOSIS — G47.33 OSA (OBSTRUCTIVE SLEEP APNEA): Primary | ICD-10-CM

## 2023-07-25 NOTE — PROGRESS NOTES
Patient was offered choice of vendor and chose [facility].  Patient Tamera Spence was set up at [facility] on July 21 2023. Patient received a Resmed Airsense 10 Pressures were set at 5-15 cm H2O.   Patient s ramp is 5 cm H2O for Auto and FLEX/EPR is 2.  Patient received a Resmed Mask name: AIRFIT N20  Nasal mask size Large, heated tubing and heated humidifier.  Patient has the following compliance requirements: none    Corinne Elizondo

## 2023-07-28 ENCOUNTER — DOCUMENTATION ONLY (OUTPATIENT)
Dept: SLEEP MEDICINE | Facility: CLINIC | Age: 53
End: 2023-07-28
Payer: COMMERCIAL

## 2023-07-28 NOTE — PROGRESS NOTES
3 day Sleep therapy management telephone visit    Diagnostic AHI: 10.1  PSG    Confirmed with patient at time of call- Yes Patient is still interested in STM service       Subjective measures:  Patient doing well with CPAP but she is getting a dry throat and she doesn't know if that is from the CPAP.  Increased humidity by 1.        Objective data     Order Settings for PAP  CPAP min     CPAP max              Device settings from machine CPAP min 5.0     CPAP max 15.0           EPR Setting TWO    RESMED soft response  OFF     Assessment: Nighty usage most nights over four hours      Action plan: Patient to have 14 day STM visit. Patient has a follow up visit scheduled:   no    Replacement device: No  STM ordered by provider: Yes     Total time spent on accessing and  interpreting remote patient PAP therapy data  10 minutes    Total time spent counseling, coaching  and reviewing PAP therapy data with patient  5 minutes    [id number] no

## 2023-08-09 ENCOUNTER — DOCUMENTATION ONLY (OUTPATIENT)
Dept: SLEEP MEDICINE | Facility: CLINIC | Age: 53
End: 2023-08-09
Payer: COMMERCIAL

## 2023-08-09 NOTE — PROGRESS NOTES
14  DAY STM VISIT    Diagnostic AHI: 10.1 4.3 PSG    Subjective measures: Patient reports things are going well with CPAP. Her dryness has resolved with recent humidity change. She had questions about cleaning her equipment which were answered.     Assessment: Pt meeting objective benchmarks.  Patient meeting subjective benchmarks.     Action plan: pt to have 30 day STM visit.      Device type: Auto-CPAP    PAP settings:  DEVICE TYPE CPAP MIN CPAP MAX 95TH % PRESSURE EPR MASK DISPENSED   Auto-CPAP    5.0 cm  H20 15.0 cm  H20 9.8 cm  H20  TWO Per patient choice     Mask type:  Per patient choice    Objective measures: 14 day rolling measures   COMPLIANCE LEAK AHI AVERAGE USE IN MINUTES   71 % 0.65 0.92 312   GOAL >70% GOAL < 24 LPM GOAL <5 GOAL >240      Patient has the following upcoming sleep appts:  Future Sleep Appointments         Provider Department    10/16/2023 8:00 AM (Arrive by 7:45 AM) Arya Horowitz DO [facility] [place]            Total time spent on accessing and interpreting remote patient PAP therapy data  10 minutes    Total time spent counseling, coaching  and reviewing PAP therapy data with patient  3 minutes    [id number]  [id number]  no (3 day STM)

## 2023-09-06 ENCOUNTER — DOCUMENTATION ONLY (OUTPATIENT)
Dept: SLEEP MEDICINE | Facility: CLINIC | Age: 53
End: 2023-09-06
Payer: COMMERCIAL

## 2023-09-06 NOTE — PROGRESS NOTES
9/6/2023- LEON- SPOKE WITH CATA AND WENT OVER HOW TO ADJUST MASK /HEADGEAR EVERY TIME SHE OUTS IT ON.  I ALSO EXPLAINED THAT SHE SHOULD CHANGE OUT HER CUSHION 1X MONTH.  SHE STATED SHE WILL GO TO [facility] IN [facility] TO GET NEW CUSHIONS.

## 2023-09-16 ENCOUNTER — HEALTH MAINTENANCE LETTER (OUTPATIENT)
Age: 53
End: 2023-09-16

## 2023-10-16 ENCOUNTER — VIRTUAL VISIT (OUTPATIENT)
Dept: SLEEP MEDICINE | Facility: CLINIC | Age: 53
End: 2023-10-16
Payer: COMMERCIAL

## 2023-10-16 VITALS — WEIGHT: 196 LBS | BODY MASS INDEX: 27.44 KG/M2 | HEIGHT: 71 IN

## 2023-10-16 DIAGNOSIS — G47.33 OBSTRUCTIVE SLEEP APNEA: Primary | ICD-10-CM

## 2023-10-16 PROCEDURE — 99213 OFFICE O/P EST LOW 20 MIN: CPT | Mod: VID | Performed by: INTERNAL MEDICINE

## 2023-10-16 ASSESSMENT — SLEEP AND FATIGUE QUESTIONNAIRES
HOW LIKELY ARE YOU TO NOD OFF OR FALL ASLEEP WHILE LYING DOWN TO REST IN THE AFTERNOON WHEN CIRCUMSTANCES PERMIT: MODERATE CHANCE OF DOZING
HOW LIKELY ARE YOU TO NOD OFF OR FALL ASLEEP WHILE SITTING QUIETLY AFTER LUNCH WITHOUT ALCOHOL: WOULD NEVER DOZE
HOW LIKELY ARE YOU TO NOD OFF OR FALL ASLEEP WHILE SITTING AND READING: WOULD NEVER DOZE
HOW LIKELY ARE YOU TO NOD OFF OR FALL ASLEEP WHILE SITTING INACTIVE IN A PUBLIC PLACE: WOULD NEVER DOZE
HOW LIKELY ARE YOU TO NOD OFF OR FALL ASLEEP WHILE WATCHING TV: WOULD NEVER DOZE
HOW LIKELY ARE YOU TO NOD OFF OR FALL ASLEEP IN A CAR, WHILE STOPPED FOR A FEW MINUTES IN TRAFFIC: WOULD NEVER DOZE
HOW LIKELY ARE YOU TO NOD OFF OR FALL ASLEEP WHEN YOU ARE A PASSENGER IN A CAR FOR AN HOUR WITHOUT A BREAK: WOULD NEVER DOZE
HOW LIKELY ARE YOU TO NOD OFF OR FALL ASLEEP WHILE SITTING AND TALKING TO SOMEONE: WOULD NEVER DOZE

## 2023-10-16 ASSESSMENT — PAIN SCALES - GENERAL: PAINLEVEL: NO PAIN (0)

## 2023-10-16 NOTE — NURSING NOTE
No other vitals to report today    Has patient had flu shot for current/most recent flu season? If so, when? No, 9/8/22      Is the patient currently in the state [facility]? YES    Visit mode:VIDEO    If the visit is dropped, the patient can be reconnected by: VIDEO VISIT: Send to e-mail at: [email or web address]    Will anyone else be joining the visit? NO  (If patient encounters technical issues they should call [phone] :[phone])    How would you like to obtain your AVS? MyChart    Are changes needed to the allergy or medication list? No    Reason for visit: RECHDANIELLE Saldana MA VVF

## 2023-10-16 NOTE — PROGRESS NOTES
Virtual Visit Details    Type of service:  Video Visit     Originating Location (pt. Location): Home  Distant Location (provider location):  Off-site  Platform used for Video Visit: AmWell    Additional 10 minutes on the date of service was spent performing the following:    -Preparing to see the patient  -Ordering medications, tests, or procedures   -Documenting clinical information in the electronic or other health record     Thank you for the opportunity to participate in the care of Tamera Spence.     She is a 52 year old y/o female patient who comes to the sleep medicine clinic for follow up. The patient had a sleep study performed on 06/15/2023 (AHI=10.1). This is the patient's 1st clinical visit since starting CPAP. She reports that her sleep quality and energy level have improved since starting CPAP.     Assessment and Plan:  In summary Tamera Spence is a 52 year old year old female who is here for follow up.    1. Obstructive sleep apnea  I congratulated the patient on her excellent CPAP usage. I will narrow her pressure range to 5-11 cwp. RTC in 2 years.  - COMPREHENSIVE DME     Compliance Download data for 30 Days:  Compliance:90%  Pressure setting:APAP 5-15  95% pressure:10.6 cwp  Leak:Minimal  Residual AHI:1.2 events per hour  Mask Tolerance:Good  Skin irritation:None  DME:[employer]    Cpap Fu Template    Question 10/16/2023  7:49 AM CDT - Filed by Patient   Do you use a CPAP Machine at home? Yes   Overall, on a scale of 0-10 how would you rate your CPAP? 8   Is your mask comfortable? Yes   Is your mask leaking? No   Do you notice snoring with mask on? No   Do you notice gasping arousals with mask on? Yes   Are you having significant oral or nasal dryness? No   Is the pressure setting comfortable? Yes   What type of mask do you use? Nasal Mask   What is your typical bedtime? 930 pm   How long does it take you to go to sleep on PAP therapy? 15 minutes   What time do you typically get out of  "bed for the day? 700 am   How many hours on average per night are you using PAP therapy? 8   How many hours are you sleeping per night? 8.5   Do you feel well rested in the morning? Yes       LISA:  LISA Total Score: 6  Total score - [place]: 2 (10/16/2023  7:51 AM)       Patient Active Problem List   Diagnosis    Recurrent major depressive disorder, in partial remission (H24)    Right foot pain       Past Medical History:   Diagnosis Date    Acid reflux     Depression 2001    Depression     Fainting        Past Surgical History:   Procedure Laterality Date    MO HOME SLEEP TEST/TYPE 3 ANTONIO  12/8/2022         WISDOM TOOTH EXTRACTION      ZZC TRANSFER OF EMBRYO,INTRAUTERINE  2005,2008       Current Outpatient Medications   Medication Sig Dispense Refill    citalopram (CELEXA) 20 MG tablet Take 1 tablet (20 mg) by mouth daily as needed (depression (not currently taking 12/30/22))         Allergies   Allergen Reactions    No Known Drug Allergy        Visual  Exam:  GEN: NAD,  Psych: normal mood, normal affect    Labs/Studies:      No results found for: \"PH\", \"PHARTERIAL\", \"PO2\", \"[id number]\", \"SAT\", \"PCO2\", \"HCO3\", \"BASEEXCESS\", \"LUCY\", \"BEB\"  Lab Results   Component Value Date    TSH 1.32 12/30/2022    TSH 1.19 10/08/2021     Lab Results   Component Value Date    GLC 86 12/30/2022    GLC 84 10/08/2021     Lab Results   Component Value Date    HGB 14.7 12/30/2022    HGB 15.0 10/08/2021     Lab Results   Component Value Date    BUN 15.9 12/30/2022    BUN 15 10/08/2021    CR 1.10 (H) 12/30/2022    CR 1.14 (H) 10/08/2021     Lab Results   Component Value Date    AST 25 12/30/2022    AST 19 10/08/2021    ALT 25 12/30/2022    ALT 15 10/08/2021    ALKPHOS 50 12/30/2022    ALKPHOS 46 10/08/2021    BILITOTAL 0.4 12/30/2022    BILITOTAL 0.8 10/08/2021     No results found for: \"UAMP\", \"UBARB\", \"BENZODIAZEUR\", \"UCANN\", \"UCOC\", \"OPIT\", \"UPCP\"    Recent Labs   Lab Test 12/30/22  0947 10/08/21  1046    140   POTASSIUM 4.2 4.9 " "  CHLORIDE 105 106   CO2 24 26   ANIONGAP 12 8   GLC 86 84   BUN 15.9 15   CR 1.10* 1.14*   LEONEL 9.3 9.6       No results found for: \"SANDY\"    I reviewed the efficacy and compliance report from her device. Data summarized on the HPI and the PAP compliance flow sheet.     Patient verbalized understanding of these issues, agrees with the plan and all questions were answered today. Patient was given an opportuntity to voice any other symptoms or concerns not listed above. Patient did not have any other symptoms or concerns.      Arya Horowitz DO  Board Certified in Internal Medicine and Sleep Medicine    (Note created with Dragon voice recognition and unintended spelling errors and word substitutions may occur)     Audio and visual devices were used for this virtual clinic visit with permission from patient.    "

## 2023-10-17 ENCOUNTER — TRANSFERRED RECORDS (OUTPATIENT)
Dept: HEALTH INFORMATION MANAGEMENT | Facility: CLINIC | Age: 53
End: 2023-10-17
Payer: COMMERCIAL

## 2023-10-20 ENCOUNTER — TRANSFERRED RECORDS (OUTPATIENT)
Dept: HEALTH INFORMATION MANAGEMENT | Facility: CLINIC | Age: 53
End: 2023-10-20
Payer: COMMERCIAL

## 2023-10-24 ENCOUNTER — OFFICE VISIT (OUTPATIENT)
Dept: AUDIOLOGY | Facility: CLINIC | Age: 53
End: 2023-10-24
Payer: COMMERCIAL

## 2023-10-24 DIAGNOSIS — Z01.10 ENCOUNTER FOR EXAMINATION OF EARS AND HEARING WITHOUT ABNORMAL FINDINGS: Primary | ICD-10-CM

## 2023-10-24 PROCEDURE — 92557 COMPREHENSIVE HEARING TEST: CPT | Performed by: AUDIOLOGIST

## 2023-10-24 PROCEDURE — 92550 TYMPANOMETRY & REFLEX THRESH: CPT | Performed by: AUDIOLOGIST

## 2023-10-24 NOTE — PROGRESS NOTES
AUDIOLOGY REPORT    SUBJECTIVE:  Tamera Spence is a 53 year old female who was seen in the Audiology Clinic at the [facility] for audiologic evaluation, referred by Self . The patient reports a perceived decline in hearing and a family history of hearing loss. The patient denies  bilateral tinnitus, bilateral otalgia, bilateral drainage, bilateral aural fullness, and history of noise exposure.  The patient notes difficulty with communication in a variety of listening situations.  They were unaccompanied today.    OBJECTIVE:  Abuse Screening:  Do you feel unsafe at home or work/school? No  Do you feel threatened by someone? No  Does anyone try to keep you from having contact with others, or doing things outside of your home? No  Physical signs of abuse present? No     Fall Risk Screen:  1. Have you fallen two or more times in the past year? No  2. Have you fallen and had an injury in the past year? No    Timed Up and Go Score (in seconds): not tested  Is patient a fall risk? No  Referral initiated: No  Fall Risk Assessment Completed by Audiology    Otoscopic exam indicates ears are clear of cerumen bilaterally     Pure Tone Thresholds assessed using conventional audiometry with good  reliability from 250-8000 Hz bilaterally using insert earphones and circumaural headphones     RIGHT:  normal hearing sensitivity      LEFT:    normal hearing sensitivity    Tympanogram:    RIGHT: normal eardrum mobility    LEFT:   normal eardrum mobility    Reflexes (reported by stimulus ear):  RIGHT: Ipsilateral is absent at frequencies tested  RIGHT: Contralateral is absent at frequencies tested  LEFT:   Ipsilateral is absent at frequencies tested  LEFT:   Contralateral is absent at frequencies tested      Speech Reception Threshold:    RIGHT: 5 dB HL    LEFT:   5 dB HL    Speech Reception Thresholds are in good agreement with pure tone thresholds.    Word Recognition Score:     RIGHT: 100% at 50 dB HL  using NU-6 recorded word list.    LEFT:   100% at 50 dB HL using NU-6 recorded word list.      ASSESSMENT:     ICD-10-CM    1. Encounter for examination of ears and hearing without abnormal findings  Z01.10           Today s results were discussed with the patient in detail.     PLAN:  Patient was counseled regarding hearing loss and impact on communication.     It is recommended that the patient have her hearing rechecked in 2-3 years, or sooner if symptoms change.  Please call this clinic with questions regarding these results or recommendations.          Martir Orozco MA, CCC-A  [place] Licensed Audiologist #[id number]  10/24/2023

## 2023-11-30 ENCOUNTER — PATIENT OUTREACH (OUTPATIENT)
Dept: CARE COORDINATION | Facility: CLINIC | Age: 53
End: 2023-11-30
Payer: COMMERCIAL

## 2023-12-14 ENCOUNTER — PATIENT OUTREACH (OUTPATIENT)
Dept: CARE COORDINATION | Facility: CLINIC | Age: 53
End: 2023-12-14
Payer: COMMERCIAL

## 2023-12-29 ENCOUNTER — TRANSFERRED RECORDS (OUTPATIENT)
Dept: HEALTH INFORMATION MANAGEMENT | Facility: CLINIC | Age: 53
End: 2023-12-29
Payer: COMMERCIAL

## 2024-02-03 ENCOUNTER — HEALTH MAINTENANCE LETTER (OUTPATIENT)
Age: 54
End: 2024-02-03

## 2024-05-01 ENCOUNTER — DOCUMENTATION ONLY (OUTPATIENT)
Dept: SLEEP MEDICINE | Facility: CLINIC | Age: 54
End: 2024-05-01
Payer: COMMERCIAL

## 2024-05-01 NOTE — PROGRESS NOTES
Patient came to [facility] for mask fitting appointment on May 1, 2024. Patient requested to switch masks because soreness/skin irritation . Discussed the following masks: P10, DW Silicone Pillows. Patient selected a Rosie RespirApsara Therapeuticss Mask name: Dreamwear Silicone Pillow mask size Medium.

## 2024-08-31 ENCOUNTER — HEALTH MAINTENANCE LETTER (OUTPATIENT)
Age: 54
End: 2024-08-31

## 2024-09-27 ENCOUNTER — TRANSFERRED RECORDS (OUTPATIENT)
Dept: HEALTH INFORMATION MANAGEMENT | Facility: CLINIC | Age: 54
End: 2024-09-27
Payer: COMMERCIAL

## 2025-01-02 ENCOUNTER — ALLIED HEALTH/NURSE VISIT (OUTPATIENT)
Dept: FAMILY MEDICINE | Facility: CLINIC | Age: 55
End: 2025-01-02
Payer: COMMERCIAL

## 2025-01-02 VITALS — TEMPERATURE: 98 F

## 2025-01-02 DIAGNOSIS — Z23 ENCOUNTER FOR IMMUNIZATION: Primary | ICD-10-CM

## 2025-01-02 NOTE — PROGRESS NOTES
Prior to immunization administration, verified patients identity using patient s name and date of birth. Please see Immunization Activity for additional information.     Is the patient's temperature normal (100.5 or less)? Yes     Patient MEETS CRITERIA. PROCEED with vaccine administration.          1/2/2025   Pneumococcal   Have you had a serious reaction to a pneumonia, diphtheria, or MMR vaccine or to something in these vaccines? No         Patient MEETS CRITERIA. PROCEED with vaccine administration.          1/2/2025   TD/TDAP   Have you had a serious reaction to a Td or Tdap vaccine or to something in these vaccines? No   Have you had coma, fainting, or seizures (encephalopathy) within 1 week of getting a DTP, DTaP, or Tdap vaccine? No   Have you had a serious reaction to thimerosal? No   Have you had a reaction (swelling, bleeding, gangrene) to a tetanus, diphtheria, or meningococcal vaccine? No   Have you had Guillain-Newburg syndrome within 6 weeks of getting a vaccine? No   Do you have seizures that aren't controlled, encephalopathy that's getting worse, or a brain disorder that's unstable or getting worse? No   Do you have an allergy to latex? No   Have you had a puncture wound, bite wound, wound with something in it, or dirty wound in the past 3 weeks? No         Patient MEETS CRITERIA. PROCEED with vaccine administration.     TDAP PREFERRED. TD acceptable if requested by the patient.      Patient instructed to remain in clinic for 15 minutes afterwards, and to report any adverse reactions.      Link to Ancillary Visit Immunization Standing Orders SmartSet     Screening performed by Rosette Mcfadden RN on 1/2/2025 at 2:46 PM.

## 2025-02-14 PROBLEM — M79.671 RIGHT FOOT PAIN: Status: RESOLVED | Noted: 2023-04-26 | Resolved: 2025-02-14

## 2025-02-14 PROBLEM — G47.33 OBSTRUCTIVE SLEEP APNEA ON CPAP: Status: ACTIVE | Noted: 2025-02-14

## 2025-02-14 PROBLEM — R87.619 ABNORMAL PAP SMEAR OF CERVIX: Status: ACTIVE | Noted: 2022-09-28

## 2025-02-17 ENCOUNTER — PATIENT OUTREACH (OUTPATIENT)
Dept: CARE COORDINATION | Facility: CLINIC | Age: 55
End: 2025-02-17
Payer: COMMERCIAL

## 2025-03-05 ENCOUNTER — VIRTUAL VISIT (OUTPATIENT)
Dept: INTERNAL MEDICINE | Facility: CLINIC | Age: 55
End: 2025-03-05
Payer: COMMERCIAL

## 2025-03-05 DIAGNOSIS — E66.3 OVERWEIGHT (BMI 25.0-29.9): ICD-10-CM

## 2025-03-05 DIAGNOSIS — N18.31 CKD STAGE 3A, GFR 45-59 ML/MIN (H): Primary | ICD-10-CM

## 2025-03-05 PROCEDURE — 98005 SYNCH AUDIO-VIDEO EST LOW 20: CPT | Performed by: INTERNAL MEDICINE

## 2025-03-05 NOTE — PROGRESS NOTES
"Tamera is a 54 year old who is being evaluated via a billable video visit.    How would you like to obtain your AVS? MyChart  If the video visit is dropped, the invitation should be resent by: Text to cell phone: [phone]  Will anyone else be joining your video visit? No      Assessment & Plan     1. CKD stage 3a, GFR 45-59 ml/min (H) (Primary)  We reviewed her kidney function dating back to 2021 and it has been stable.  She has no proteinuria and no blood in her urine.  Very rare use of NSAIDs.  Discussed ultrasound to look for any anatomical issues or obstruction.  Discussed limiting NSAID use but still okay to use on occasion as she is doing.  We discussed the main drivers of chronic kidney disease including hypertension and diabetes and focusing her lifestyle choices to prevent development of these.  - US Renal Complete Non-Vascular; Future    2. Overweight (BMI 25.0-29.9)  As above, asked about goal weight that I think a weight of 175 to 180 pounds would be an excellent goal for her    The longitudinal plan of care for the diagnosis(es)/condition(s) as documented were addressed during this visit. Due to the added complexity in care, I will continue to support Tamera in the subsequent management and with ongoing continuity of care.      BMI  Estimated body mass index is 27.52 kg/m  as calculated from the following:    Height as of 2/14/25: 1.803 m (5' 11\").    Weight as of 2/14/25: 89.5 kg (197 lb 4.8 oz).     Subjective   Tamera is a 54 year old, presenting for the following health issues:  Results (Creatinine resulted high in recent labs from 2/14. Patient is concerned for this because there is a family Hx chronic kidney disease, Stg 3. )      3/5/2025     8:04 AM   Additional Questions   Roomed by Shonna VEGA RN     History of Present Illness       Reason for visit:  Go over lab results - high creat   She is taking medications regularly.            Objective    Vitals - Patient Reported  Pain Score: No " Pain (0)    Physical Exam         Video-Visit Details    Type of service:  Video Visit   Originating Location (pt. Location): Home    Distant Location (provider location):  On-site  Platform used for Video Visit: Kal  Signed Electronically by: Allan To MD     Stable

## 2025-03-11 ENCOUNTER — ANCILLARY PROCEDURE (OUTPATIENT)
Dept: MAMMOGRAPHY | Facility: CLINIC | Age: 55
End: 2025-03-11
Attending: INTERNAL MEDICINE
Payer: COMMERCIAL

## 2025-03-11 DIAGNOSIS — Z12.31 VISIT FOR SCREENING MAMMOGRAM: ICD-10-CM

## 2025-03-11 PROCEDURE — 77067 SCR MAMMO BI INCL CAD: CPT | Mod: TC | Performed by: RADIOLOGY

## 2025-03-11 PROCEDURE — 77063 BREAST TOMOSYNTHESIS BI: CPT | Mod: TC | Performed by: RADIOLOGY

## 2025-03-17 ENCOUNTER — ANCILLARY PROCEDURE (OUTPATIENT)
Dept: ULTRASOUND IMAGING | Facility: CLINIC | Age: 55
End: 2025-03-17
Attending: INTERNAL MEDICINE
Payer: COMMERCIAL

## 2025-03-17 DIAGNOSIS — N18.31 CKD STAGE 3A, GFR 45-59 ML/MIN (H): ICD-10-CM

## 2025-03-17 PROCEDURE — 76770 US EXAM ABDO BACK WALL COMP: CPT | Mod: GC | Performed by: RADIOLOGY

## 2025-03-27 ENCOUNTER — OFFICE VISIT (OUTPATIENT)
Dept: OBGYN | Facility: CLINIC | Age: 55
End: 2025-03-27
Payer: COMMERCIAL

## 2025-03-27 VITALS
SYSTOLIC BLOOD PRESSURE: 136 MMHG | BODY MASS INDEX: 26.65 KG/M2 | DIASTOLIC BLOOD PRESSURE: 87 MMHG | WEIGHT: 190.4 LBS | HEIGHT: 71 IN | HEART RATE: 77 BPM

## 2025-03-27 DIAGNOSIS — N89.8 VAGINAL DRYNESS: Primary | ICD-10-CM

## 2025-03-27 DIAGNOSIS — Z12.4 SCREENING FOR CERVICAL CANCER: ICD-10-CM

## 2025-03-27 PROCEDURE — 99213 OFFICE O/P EST LOW 20 MIN: CPT

## 2025-03-27 RX ORDER — ESTRADIOL 10 UG/1
10 TABLET, FILM COATED VAGINAL
Qty: 24 TABLET | Refills: 3 | Status: SHIPPED | OUTPATIENT
Start: 2025-03-27

## 2025-03-27 NOTE — PATIENT INSTRUCTIONS
Thank you for trusting us with your care!   Please be aware, if you are on Mychart, you may see your results prior to your providers review. If labs are abnormal, we will call or message you on CoinJart with a follow up plan.    If you need to contact us for questions about:  Symptoms, Scheduling & Medical Questions; Non-urgent (2-3 day response) HotDesk message, Urgent (needing response today) [phone] (if after 3:30pm next day response)   Prescriptions: Please call your Pharmacy   Billing: [facility] [phone] or [facility]:[phone]

## 2025-03-27 NOTE — PROGRESS NOTES
[facility]  Gynecology Visit    Reason for Visit: menopause    HPI:    Tamera Spence is a 54 year old , here for menopause. She reports that a few years ago she had hot flashes which then resolved. These hot flashes have been returning of late but overall are rare and unpredictable. She does note that coffee tends to cause them but she can tell they aren't too bothersome as she continues to have coffee. She recently had her IUD removed and did not have another placed a she assumes she is likely post menopausal. She was told by that OBGYN that we won't be able to say for certain unless she remains amenorrheic for a year. She is wondering if that is true. In regards to her uncertain menopause status, she is asking about birth control needs.    She reports she is actually more concerned with vaginal dryness. She reports some discomfort with intercourse related to this, specifically burning at her perineum. Inquiring about treatment as well as lubricant options.    GYN History  Menses (length/bleeding/pain/symptoms): uncertain as she has had an IUD for years which was just removed a few weeks ago  Pap: ASCUS HR HPV pos last (2022), overdue for pap  History of abnormal paps: yes, see above  Contraception: see below    OBHx  OB History    Para Term  AB Living   1 1 1 0 0 1   SAB IAB Ectopic Multiple Live Births   0 0 0 0 1      # Outcome Date GA Lbr Darian/2nd Weight Sex Type Anes PTL Lv   1 Term 09 40w2d   F CS-LTranv   DARRION      Apgar1: 7  Apgar5: 9       Past Medical History:   Diagnosis Date    Acid reflux     Depression 2001    Fainting        Past Surgical History:   Procedure Laterality Date    CATARACT EXTRACTION Right     IL HOME SLEEP TEST/TYPE 3 ANTONIO  2022         WISDOM TOOTH EXTRACTION      ZZC TRANSFER OF EMBRYO,INTRAUTERINE  ,         Current Outpatient Medications:     estradiol (VAGIFEM) 10 MCG TABS vaginal tablet, Place 1 tablet (10 mcg) vaginally  twice a week., Disp: 24 tablet, Rfl: 3    citalopram (CELEXA) 20 MG tablet, Take 1 tablet (20 mg) by mouth daily as needed (depression (not currently taking 12/30/22)) (Patient not taking: Reported on 3/27/2025), Disp: , Rfl:     Allergies   Allergen Reactions    No Known Drug Allergy        Family History   Problem Relation Age of Onset    Hypertension Mother     Depression Mother     Psychotic Disorder Mother         adhd    Kidney Disease Mother     Hypertension Father     Musculoskeletal Disorder Father         multiple joint, back problems    Sleep Apnea Father     Attention Deficit Disorder Sister     Psychotic Disorder Sister     Depression Sister     Attention Deficit Disorder Sister     No Known Problems Daughter     No Known Problems Son        Social History     Socioeconomic History    Marital status:      Spouse name: Not on file    Number of children: Not on file    Years of education: Not on file    Highest education level: Not on file   Occupational History    Not on file   Tobacco Use    Smoking status: Never    Smokeless tobacco: Never   Substance and Sexual Activity    Alcohol use: Yes     Comment: Alcoholic Drinks/day: 1/month    Drug use: No    Sexual activity: Yes     Partners: Male   Other Topics Concern    Not on file   Social History Narrative    . She has son (tranistioned from girl to boy), Anthony (2009), and daughter Mariya (2006). 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  She is originally from [place], [place], and  attended college at the [employer] and [employer].      Social Drivers of Health     Financial Resource Strain: Low Risk  (2/14/2025)    Financial Resource Strain     Within the past 12 months, have you or your family members you live with been unable to get utilities (heat, electricity) when it was really needed?: No   Food Insecurity: Low Risk  (2/14/2025)    Food Insecurity  "    Within the past 12 months, did you worry that your food would run out before you got money to buy more?: No     Within the past 12 months, did the food you bought just not last and you didn t have money to get more?: No   Transportation Needs: Low Risk  (2/14/2025)    Transportation Needs     Within the past 12 months, has lack of transportation kept you from medical appointments, getting your medicines, non-medical meetings or appointments, work, or from getting things that you need?: No   Physical Activity: Unknown (2/14/2025)    Exercise Vital Sign     Days of Exercise per Week: 2 days     Minutes of Exercise per Session: Not on file   Stress: Stress Concern Present (2/14/2025)    [employer] [place] [employer] - Occupational Stress Questionnaire     Feeling of Stress : To some extent   Social Connections: Unknown (2/14/2025)    Social Connection and Isolation Panel [NHANES]     Frequency of Communication with Friends and Family: Not on file     Frequency of Social Gatherings with Friends and Family: Once a week     Attends Mosque Services: Not on file     Active Member of Clubs or Organizations: Not on file     Attends Club or Organization Meetings: Not on file     Marital Status: Not on file   Interpersonal Safety: Low Risk  (2/14/2025)    Interpersonal Safety     Do you feel physically and emotionally safe where you currently live?: Yes     Within the past 12 months, have you been hit, slapped, kicked or otherwise physically hurt by someone?: No     Within the past 12 months, have you been humiliated or emotionally abused in other ways by your partner or ex-partner?: No   Housing Stability: Low Risk  (2/14/2025)    Housing Stability     Do you have housing? : Yes     Are you worried about losing your housing?: No       ROS: 10-Point ROS negative except as noted in HPI    Physical Exam  /87   Pulse 77   Ht 1.803 m (5' 11\")   Wt 86.4 kg (190 lb 6.4 oz)   BMI 26.56 kg/m  "   Gen: Well-appearing, NAD  HEENT: Normocephalic, atraumatic  CV:  Regular rate, well perfused  Pulm: Normal work of breathing    Pelvic:  External female genitalia with hypopigmentation and some signs of atrophy. Normal hair distribution. Vagina is without lesions. Physiologic discharge. Cervix grossly normal with no lesions, no cervical motion tenderness.     Assessment/Plan:  Tamera Spence is a 54 year old  female here for discussion of menopause and routine health maintenance.     Menopause  Offered selective serotonin reuptake inhibitor for hot flashes, not interested at this time as these aren't as bothersome and she wishes to avoid the risk of decreased libido. She initially reported wanting to avoid hormones for her vaginal dryness as well but on counseling of the use of local estrogen was amenable to this. She inquired about if she would be able to discontinue this after intiation, wondering if her body will start maintaining itself once the estrogen is introduced. Counseled that this is unlikely as her dryness is likely from low estrogen from her ovaries stopping production which would not be reversed. She also inquired about lubricant use. Advised that silicone lubricants are safest with condoms. Recommend against anything with fragrance or added sensation (tingling, etc) as this could worsen irritation.     Contraceptive planning  She is wondering about any contraceptive that would not disrupt her cycle. Discussed avoidance of hormones to monitor her natural cycle to determine menopause status. Overall, condoms are the top recommendation for pregnancy and STI prevention. She was wondering about any other ways to track her cycles. Discussed use of ovulation predictor kits as well. Counseled that with no known cycle to base these off of, she would have to use them daily and would still risk ovulating the day after intercourse and needing emergency contraception, etc. She expressed understanding  that this would be less certain than barrier contraceptive but was interested in being able to track her cycle and also had concerns about condom efficacy with partners with ED.    Preventative Care  Last pap 09/2022 ASC-US, HPV HR other positive  - pap done today    Return to clinic in 1 year for HRT refill or sooner as needed.    Staffed with Dr. Mike Gallagher MD  Obstetrics & Gynecology, PGY-2  03/27/2025 6:46 PM    The patient was seen in resident continuity clinic by Dr. Gallagher.  I have reviewed the history and exam, the assessment and plan were jointly made.     Mayi Mckeon MD, FACOG

## 2025-04-01 LAB
BKR AP ASSOCIATED HPV REPORT: NORMAL
BKR LAB AP GYN ADEQUACY: NORMAL
BKR LAB AP GYN INTERPRETATION: NORMAL
BKR LAB AP PREVIOUS ABNL DX: NORMAL
BKR LAB AP PREVIOUS ABNORMAL: NORMAL
PATH REPORT.COMMENTS IMP SPEC: NORMAL
PATH REPORT.COMMENTS IMP SPEC: NORMAL
PATH REPORT.RELEVANT HX SPEC: NORMAL

## 2025-04-02 ENCOUNTER — TELEPHONE (OUTPATIENT)
Dept: OBGYN | Facility: CLINIC | Age: 55
End: 2025-04-02
Payer: COMMERCIAL

## 2025-04-02 DIAGNOSIS — N89.8 VAGINAL DRYNESS: Primary | ICD-10-CM

## 2025-04-02 NOTE — TELEPHONE ENCOUNTER
[facility] Call Center    Phone Message    May a detailed message be left on voicemail: yes     Reason for Call: Medication Question or concern regarding medication   Prescription Clarification  Name of Medication: estradiol (VAGIFEM) 10 MCG TABS vaginal tablet   Prescribing Provider: Dr. Mckeon   Pharmacy: [facility] - [place], [place] - [address]   What on the order needs clarification? Pt is requesting a different kind/form of this Rx      Action Taken: Other: OBGYN    Travel Screening: Not Applicable     Date of Service:

## 2025-04-02 NOTE — TELEPHONE ENCOUNTER
Per my other note:    Spoke with Tamera, who is concerned about the single-use plastic applicators that are used to administer Vagifem tablets. Recommended she call her pharmacy to determine if there is an alternative to the tablets that does not come with the single use applicators, as she could feasibly use her fingers to insert the suppository and push it up as far as possible.      Pt will reach out to her pharmacy -- if this alternative is not available and she wants to switch to the cream, she will let us know.        Tamera has called her pharmacy, who do not know of any alternative vaginal estradiol suppositories that do not come with the single use applicators. States that she and Dr. Gallagher had briefly discussed another type of estradiol suppository that they could try -- will route to resident pool + Dr. Mckeon for their insight.

## 2025-04-02 NOTE — TELEPHONE ENCOUNTER
[facility] Call Center    Phone Message    May a detailed message be left on voicemail: yes     Reason for Call: Other: Patient was calling back the nurse in regards to the mediation problems she was having with the pharmacy. Please call  her back to discuss. Thank you. I did reach out to secure chat but no response.      Action Taken: Other: ob/gyn    Travel Screening: Not Applicable     Date of Service:

## 2025-04-02 NOTE — TELEPHONE ENCOUNTER
Spoke with Tamera, who is concerned about the single-use plastic applicators that are used to administer Vagifem tablets. Recommended she call her pharmacy to determine if there is an alternative to the tablets that does not come with the single use applicators, as she could feasibly use her fingers to insert the suppository and push it up as far as possible.     Pt will reach out to her pharmacy -- if this alternative is not available and she wants to switch to the cream, she will let us know.

## 2025-04-04 NOTE — TELEPHONE ENCOUNTER
This RN took a call from Tamera. She was asking about alternate mediation that use less packaging and plastic. She would like to switch to estradiol cream. Told her I would let Dr. Mckeon know she wants to switch and it will get sent in to her pharmacy once she signs off on it. Tamera verbalized understanding.

## 2025-04-06 RX ORDER — ESTRADIOL 0.1 MG/G
2 CREAM VAGINAL
Qty: 42.5 G | Refills: 11 | Status: SHIPPED | OUTPATIENT
Start: 2025-04-07

## 2025-06-02 ENCOUNTER — TRANSFERRED RECORDS (OUTPATIENT)
Dept: HEALTH INFORMATION MANAGEMENT | Facility: CLINIC | Age: 55
End: 2025-06-02
Payer: COMMERCIAL

## 2025-07-28 ENCOUNTER — MYC MEDICAL ADVICE (OUTPATIENT)
Dept: INTERNAL MEDICINE | Facility: CLINIC | Age: 55
End: 2025-07-28
Payer: COMMERCIAL

## 2025-07-28 DIAGNOSIS — G47.33 OBSTRUCTIVE SLEEP APNEA ON CPAP: Primary | ICD-10-CM
